# Patient Record
Sex: MALE | Race: WHITE | NOT HISPANIC OR LATINO | Employment: OTHER | URBAN - METROPOLITAN AREA
[De-identification: names, ages, dates, MRNs, and addresses within clinical notes are randomized per-mention and may not be internally consistent; named-entity substitution may affect disease eponyms.]

---

## 2017-08-20 ENCOUNTER — APPOINTMENT (OUTPATIENT)
Dept: RADIOLOGY | Facility: MEDICAL CENTER | Age: 37
End: 2017-08-20
Attending: EMERGENCY MEDICINE

## 2017-08-20 ENCOUNTER — RESOLUTE PROFESSIONAL BILLING HOSPITAL PROF FEE (OUTPATIENT)
Dept: HOSPITALIST | Facility: MEDICAL CENTER | Age: 37
End: 2017-08-20

## 2017-08-20 ENCOUNTER — HOSPITAL ENCOUNTER (OUTPATIENT)
Facility: MEDICAL CENTER | Age: 37
End: 2017-08-21
Attending: EMERGENCY MEDICINE | Admitting: HOSPITALIST
Payer: COMMERCIAL

## 2017-08-20 DIAGNOSIS — R07.9 CHEST PAIN, UNSPECIFIED TYPE: ICD-10-CM

## 2017-08-20 DIAGNOSIS — R00.2 PALPITATIONS: ICD-10-CM

## 2017-08-20 LAB
ALBUMIN SERPL BCP-MCNC: 4.5 G/DL (ref 3.2–4.9)
ALBUMIN/GLOB SERPL: 1.7 G/DL
ALP SERPL-CCNC: 65 U/L (ref 30–99)
ALT SERPL-CCNC: 21 U/L (ref 2–50)
ANION GAP SERPL CALC-SCNC: 8 MMOL/L (ref 0–11.9)
APTT PPP: 29.1 SEC (ref 24.7–36)
AST SERPL-CCNC: 24 U/L (ref 12–45)
BASOPHILS # BLD AUTO: 0.9 % (ref 0–1.8)
BASOPHILS # BLD: 0.05 K/UL (ref 0–0.12)
BILIRUB SERPL-MCNC: 0.4 MG/DL (ref 0.1–1.5)
BUN SERPL-MCNC: 14 MG/DL (ref 8–22)
CALCIUM SERPL-MCNC: 9.9 MG/DL (ref 8.5–10.5)
CHLORIDE SERPL-SCNC: 104 MMOL/L (ref 96–112)
CO2 SERPL-SCNC: 25 MMOL/L (ref 20–33)
CREAT SERPL-MCNC: 0.87 MG/DL (ref 0.5–1.4)
EKG IMPRESSION: NORMAL
EOSINOPHIL # BLD AUTO: 0.35 K/UL (ref 0–0.51)
EOSINOPHIL NFR BLD: 6.4 % (ref 0–6.9)
ERYTHROCYTE [DISTWIDTH] IN BLOOD BY AUTOMATED COUNT: 39.8 FL (ref 35.9–50)
GFR SERPL CREATININE-BSD FRML MDRD: >60 ML/MIN/1.73 M 2
GLOBULIN SER CALC-MCNC: 2.7 G/DL (ref 1.9–3.5)
GLUCOSE SERPL-MCNC: 126 MG/DL (ref 65–99)
HCT VFR BLD AUTO: 46.7 % (ref 42–52)
HGB BLD-MCNC: 16.5 G/DL (ref 14–18)
IMM GRANULOCYTES # BLD AUTO: 0.01 K/UL (ref 0–0.11)
IMM GRANULOCYTES NFR BLD AUTO: 0.2 % (ref 0–0.9)
INR PPP: 1.01 (ref 0.87–1.13)
LIPASE SERPL-CCNC: 31 U/L (ref 11–82)
LYMPHOCYTES # BLD AUTO: 1.92 K/UL (ref 1–4.8)
LYMPHOCYTES NFR BLD: 35 % (ref 22–41)
MCH RBC QN AUTO: 31 PG (ref 27–33)
MCHC RBC AUTO-ENTMCNC: 35.3 G/DL (ref 33.7–35.3)
MCV RBC AUTO: 87.6 FL (ref 81.4–97.8)
MONOCYTES # BLD AUTO: 0.4 K/UL (ref 0–0.85)
MONOCYTES NFR BLD AUTO: 7.3 % (ref 0–13.4)
NEUTROPHILS # BLD AUTO: 2.75 K/UL (ref 1.82–7.42)
NEUTROPHILS NFR BLD: 50.2 % (ref 44–72)
NRBC # BLD AUTO: 0 K/UL
NRBC BLD AUTO-RTO: 0 /100 WBC
PLATELET # BLD AUTO: 272 K/UL (ref 164–446)
PMV BLD AUTO: 9.6 FL (ref 9–12.9)
POTASSIUM SERPL-SCNC: 3.1 MMOL/L (ref 3.6–5.5)
PROT SERPL-MCNC: 7.2 G/DL (ref 6–8.2)
PROTHROMBIN TIME: 13.6 SEC (ref 12–14.6)
RBC # BLD AUTO: 5.33 M/UL (ref 4.7–6.1)
SODIUM SERPL-SCNC: 137 MMOL/L (ref 135–145)
TROPONIN I SERPL-MCNC: <0.01 NG/ML (ref 0–0.04)
WBC # BLD AUTO: 5.5 K/UL (ref 4.8–10.8)

## 2017-08-20 PROCEDURE — 85025 COMPLETE CBC W/AUTO DIFF WBC: CPT

## 2017-08-20 PROCEDURE — 82330 ASSAY OF CALCIUM: CPT

## 2017-08-20 PROCEDURE — 84484 ASSAY OF TROPONIN QUANT: CPT

## 2017-08-20 PROCEDURE — 71010 DX-CHEST-LIMITED (1 VIEW): CPT

## 2017-08-20 PROCEDURE — 85610 PROTHROMBIN TIME: CPT

## 2017-08-20 PROCEDURE — 94760 N-INVAS EAR/PLS OXIMETRY 1: CPT

## 2017-08-20 PROCEDURE — 99285 EMERGENCY DEPT VISIT HI MDM: CPT

## 2017-08-20 PROCEDURE — 83690 ASSAY OF LIPASE: CPT

## 2017-08-20 PROCEDURE — G0378 HOSPITAL OBSERVATION PER HR: HCPCS

## 2017-08-20 PROCEDURE — 36415 COLL VENOUS BLD VENIPUNCTURE: CPT

## 2017-08-20 PROCEDURE — 93005 ELECTROCARDIOGRAM TRACING: CPT

## 2017-08-20 PROCEDURE — 85730 THROMBOPLASTIN TIME PARTIAL: CPT

## 2017-08-20 PROCEDURE — 93005 ELECTROCARDIOGRAM TRACING: CPT | Performed by: EMERGENCY MEDICINE

## 2017-08-20 PROCEDURE — 80053 COMPREHEN METABOLIC PANEL: CPT

## 2017-08-20 RX ORDER — AMOXICILLIN 250 MG
2 CAPSULE ORAL 2 TIMES DAILY
Status: DISCONTINUED | OUTPATIENT
Start: 2017-08-21 | End: 2017-08-21 | Stop reason: HOSPADM

## 2017-08-20 RX ORDER — BISACODYL 10 MG
10 SUPPOSITORY, RECTAL RECTAL
Status: DISCONTINUED | OUTPATIENT
Start: 2017-08-20 | End: 2017-08-21 | Stop reason: HOSPADM

## 2017-08-20 RX ORDER — ALBUTEROL SULFATE 90 UG/1
2 AEROSOL, METERED RESPIRATORY (INHALATION) EVERY 6 HOURS PRN
COMMUNITY
End: 2018-01-17 | Stop reason: SDUPTHER

## 2017-08-20 RX ORDER — POLYETHYLENE GLYCOL 3350 17 G/17G
1 POWDER, FOR SOLUTION ORAL
Status: DISCONTINUED | OUTPATIENT
Start: 2017-08-20 | End: 2017-08-21 | Stop reason: HOSPADM

## 2017-08-20 RX ORDER — FLUTICASONE PROPIONATE 50 MCG
2 SPRAY, SUSPENSION (ML) NASAL
COMMUNITY
End: 2018-01-17

## 2017-08-20 RX ORDER — CETIRIZINE HYDROCHLORIDE 10 MG/1
10 TABLET ORAL DAILY
COMMUNITY

## 2017-08-20 RX ORDER — PSEUDOEPHEDRINE HCL 30 MG
30 TABLET ORAL EVERY 12 HOURS PRN
COMMUNITY
End: 2018-01-17

## 2017-08-20 ASSESSMENT — ENCOUNTER SYMPTOMS
NERVOUS/ANXIOUS: 0
ABDOMINAL PAIN: 0
FEVER: 0
DIARRHEA: 0
SORE THROAT: 0
NAUSEA: 0
VOMITING: 0
PND: 0
CHILLS: 0
PALPITATIONS: 1
COUGH: 0
HEADACHES: 0
DIZZINESS: 1
DIAPHORESIS: 0

## 2017-08-20 ASSESSMENT — COPD QUESTIONNAIRES
HAVE YOU SMOKED AT LEAST 100 CIGARETTES IN YOUR ENTIRE LIFE: NO/DON'T KNOW
DURING THE PAST 4 WEEKS HOW MUCH DID YOU FEEL SHORT OF BREATH: SOME OF THE TIME
COPD SCREENING SCORE: 2
DO YOU EVER COUGH UP ANY MUCUS OR PHLEGM?: YES, A FEW DAYS A WEEK OR MONTH

## 2017-08-20 ASSESSMENT — LIFESTYLE VARIABLES
EVER_SMOKED: NEVER
SUBSTANCE_ABUSE: 0

## 2017-08-20 ASSESSMENT — PAIN SCALES - GENERAL: PAINLEVEL_OUTOF10: 0

## 2017-08-20 NOTE — IP AVS SNAPSHOT
" Home Care Instructions                                                                                                                  Name:Mariano Mata  Medical Record Number:[id number]  CSN: [id number]    YOB: 1980   Age: 36 y.o.  Sex: male  HT:1.727 m (5' 8\") WT: 76.6 kg (168 lb 14 oz)          Admit Date: 8/20/2017     Discharge Date:   Today's Date: 8/21/2017  Attending Doctor:  Raúl Ramirez M.D.                  Allergies:  Review of patient's allergies indicates no active allergies.            Discharge Instructions       Discharge Instructions    Discharged to home by car with relative. Discharged via walking, hospital escort: Refused.  Special equipment needed: Not Applicable    Be sure to schedule a follow-up appointment with your primary care doctor or any specialists as instructed.     Discharge Plan:   Diet Plan: Discussed  Activity Level: Discussed  Confirmed Follow up Appointment: Patient to Call and Schedule Appointment  Confirmed Symptoms Management: Discussed  Medication Reconciliation Updated: Yes  Influenza Vaccine Indication: Patient Refuses    I understand that a diet low in cholesterol, fat, and sodium is recommended for good health. Unless I have been given specific instructions below for another diet, I accept this instruction as my diet prescription.   Other diet: Heart Healthy    Special Instructions: None    · Is patient discharged on Warfarin / Coumadin?   No     · Is patient Post Blood Transfusion?  No    Depression / Suicide Risk    As you are discharged from this [facility] facility, it is important to learn how to keep safe from harming yourself.    Recognize the warning signs:  · Abrupt changes in personality, positive or negative- including increase in energy   · Giving away possessions  · Change in eating patterns- significant weight changes-  positive or negative  · Change in sleeping patterns- unable to sleep or sleeping all the time   · Unwillingness or " inability to communicate  · Depression  · Unusual sadness, discouragement and loneliness  · Talk of wanting to die  · Neglect of personal appearance   · Rebelliousness- reckless behavior  · Withdrawal from people/activities they love  · Confusion- inability to concentrate     If you or a loved one observes any of these behaviors or has concerns about self-harm, here's what you can do:  · Talk about it- your feelings and reasons for harming yourself  · Remove any means that you might use to hurt yourself (examples: pills, rope, extension cords, firearm)  · Get professional help from the community (Mental Health, Substance Abuse, psychological counseling)  · Do not be alone:Call your Safe Contact- someone whom you trust who will be there for you.  · Call your local CRISIS HOTLINE 404-2208 or 836-006-6962  · Call your local Children's Mobile Crisis Response Team [place] (876) 395-6847 or www.First Coverage  · Call the toll free National Suicide Prevention Hotlines   · National Suicide Prevention Lifeline 858-191-KRJG (4681)  · National Hope Line Network 800-SUICIDE (221-0604)    Stress and Stress Management  Stress is a normal reaction to life events. It is what you feel when life demands more than you are used to or more than you can handle. Some stress can be useful. For example, the stress reaction can help you catch the last bus of the day, study for a test, or meet a deadline at work. But stress that occurs too often or for too long can cause problems. It can affect your emotional health and interfere with relationships and normal daily activities. Too much stress can weaken your immune system and increase your risk for physical illness. If you already have a medical problem, stress can make it worse.  CAUSES   All sorts of life events may cause stress. An event that causes stress for one person may not be stressful for another person. Major life events commonly cause stress. These may be positive or  negative. Examples include losing your job, moving into a new home, getting , having a baby, or losing a loved one. Less obvious life events may also cause stress, especially if they occur day after day or in combination. Examples include working long hours, driving in traffic, caring for children, being in debt, or being in a difficult relationship.  SIGNS AND SYMPTOMS  Stress may cause emotional symptoms including, the following:  · Anxiety. This is feeling worried, afraid, on edge, overwhelmed, or out of control.  · Anger. This is feeling irritated or impatient.  · Depression. This is feeling sad, down, helpless, or guilty.  · Difficulty focusing, remembering, or making decisions.  Stress may cause physical symptoms, including the following:   · Aches and pains. These may affect your head, neck, back, stomach, or other areas of your body.  · Tight muscles or clenched jaw.  · Low energy or trouble sleeping.   Stress may cause unhealthy behaviors, including the following:   · Eating to feel better (overeating) or skipping meals.  · Sleeping too little, too much, or both.  · Working too much or putting off tasks (procrastination).  · Smoking, drinking alcohol, or using drugs to feel better.  DIAGNOSIS   Stress is diagnosed through an assessment by your health care provider. Your health care provider will ask questions about your symptoms and any stressful life events. Your health care provider will also ask about your medical history and may order blood tests or other tests. Certain medical conditions and medicine can cause physical symptoms similar to stress.  Mental illness can cause emotional symptoms and unhealthy behaviors similar to stress. Your health care provider may refer you to a mental health professional for further evaluation.   TREATMENT   Stress management is the recommended treatment for stress. The goals of stress management are reducing stressful life events and coping with stress in  "healthy ways.   Techniques for reducing stressful life events include the following:  · Stress identification. Self-monitor for stress and identify what causes stress for you. These skills may help you to avoid some stressful events.  · Time management. Set your priorities, keep a calendar of events, and learn to say \"no.\" These tools can help you avoid making too many commitments.  Techniques for coping with stress include the following:  · Rethinking the problem. Try to think realistically about stressful events rather than ignoring them or overreacting. Try to find the positives in a stressful situation rather than focusing on the negatives.  · Exercise. Physical exercise can release both physical and emotional tension. The key is to find a form of exercise you enjoy and do it regularly.  · Relaxation techniques. These relax the body and mind. Examples include yoga, meditation, van chi, biofeedback, deep breathing, progressive muscle relaxation, listening to music, being out in nature, journaling, and other hobbies. Again, the key is to find one or more that you enjoy and can do regularly.  · Healthy lifestyle. Eat a balanced diet, get plenty of sleep, and do not smoke. Avoid using alcohol or drugs to relax.  · Strong support network. Spend time with family, friends, or other people you enjoy being around. Express your feelings and talk things over with someone you trust.  Counseling or talk therapy with a mental health professional may be helpful if you are having difficulty managing stress on your own. Medicine is typically not recommended for the treatment of stress. Talk to your health care provider if you think you need medicine for symptoms of stress.  HOME CARE INSTRUCTIONS  · Keep all follow-up visits as directed by your health care provider.  · Take all medicines as directed by your health care provider.  SEEK MEDICAL CARE IF:  · Your symptoms get worse or you start having new symptoms.  · You feel " overwhelmed by your problems and can no longer manage them on your own.  SEEK IMMEDIATE MEDICAL CARE IF:  · You feel like hurting yourself or someone else.     This information is not intended to replace advice given to you by your health care provider. Make sure you discuss any questions you have with your health care provider.     Document Released: 06/13/2002 Document Revised: 01/08/2016 Document Reviewed: 08/12/2014  [employer] Patient Education ©2016 [employer]          Your appointments     Aug 23, 2017 11:30 AM   CARE MANAGER TELEPHONE VISIT with CARE MANAGER   [facility]    [address]  [place] [id number] [phone]           ***IMPORTANT**** This is a phone visit only. Do not come into the office. The Care Manager will call you at the scheduled time for Care Manager Telephone Visit.            Aug 24, 2017 10:20 AM   Established Patient with Ellen Kohli M.D.   [facility]    [address]  [place] [id number] [phone]           You will be receiving a confirmation call a few days before your appointment from our automated call confirmation system.            Sep 05, 2017  8:20 AM   Access New Patient with Mariano Velazquez PA-C   [facility])    [address]  [address]  [place] [id number] [phone]           Please bring Photo ID, Insurance Cards, All Medication Bottles and copies of any legal documents (such as Living Will, Power of ) If speaking a language besides English please bring an adult . Please arrive 30 minutes prior for check in and registration. You will be receiving a confirmation call a few days before your appointment from our automated call confirmation system.              Follow-up Information     1. Follow up with Pcp Pt States None In 2 weeks.    Specialty:  Family Medicine    Why:  need to establish with a PCP            Discharge Medication Instructions:    Below are the medications your physician expects you to take upon discharge:    Review all your home medications and newly ordered medications with your doctor and/or pharmacist. Follow medication instructions as directed by your doctor and/or pharmacist.    Please keep your medication list with you and share with your physician.               Medication List      CONTINUE taking these medications        Instructions    Morning Afternoon Evening Bedtime    albuterol 108 (90 Base) MCG/ACT Aers inhalation aerosol        Inhale 2 Puffs by mouth every 6 hours as needed for Shortness of Breath.   Dose:  2 Puff                        cetirizine 10 MG Tabs   Commonly known as:  ZYRTEC        Take 10 mg by mouth every day.   Dose:  10 mg                        fluticasone 50 MCG/ACT nasal spray   Commonly known as:  FLONASE        Spray 2 Sprays in nose 1 time daily as needed.   Dose:  2 Spray                        ONE-A-DAY MENS HEALTH FORMULA PO        Take 1 Tab by mouth every day.   Dose:  1 Tab                        PROBIOTIC & ACIDOPHILUS EX ST PO        Take 1 Tab by mouth every day.   Dose:  1 Tab                        pseudoephedrine 30 MG Tabs   Commonly known as:  SUDAFED        Take 30 mg by mouth every 12 hours as needed for Congestion.   Dose:  30 mg                                Instructions           Diet / Nutrition:    Follow any diet instructions given to you by your doctor or the dietician, including how much salt (sodium) you are allowed each day.    If you are overweight, talk to your doctor about a weight reduction plan.    Activity:    Remain physically active following your doctor's instructions about exercise and activity.    Rest often.     Any time you become even a little tired or short of breath, SIT DOWN and rest.    Worsening Symptoms:    Report any of the following signs and symptoms to the doctor's office immediately:    *Pain of jaw, arm, or  neck  *Chest pain not relieved by medication                               *Dizziness or loss of consciousness  *Difficulty breathing even when at rest   *More tired than usual                                       *Bleeding drainage or swelling of surgical site  *Swelling of feet, ankles, legs or stomach                 *Fever (>100ºF)  *Pink or blood tinged sputum  *Weight gain (3lbs/day or 5lbs /week)           *Shock from internal defibrillator (if applicable)  *Palpitations or irregular heartbeats                *Cool and/or numb extremities    Stroke Awareness    Common Risk Factors for Stroke include:    Age  Atrial Fibrillation  Carotid Artery Stenosis  Diabetes Mellitus  Excessive alcohol consumption  High blood pressure  Overweight   Physical inactivity  Smoking    Warning signs and symptoms of a stroke include:    *Sudden numbness or weakness of the face, arm or leg (especially on one side of the body).  *Sudden confusion, trouble speaking or understanding.  *Sudden trouble seeing in one or both eyes.  *Sudden trouble walking, dizziness, loss of balance or coordination.Sudden severe headache with no known cause.    It is very important to get treatment quickly when a stroke occurs. If you experience any of the above warning signs, call 911 immediately.                   Disclaimer         Quit Smoking / Tobacco Use:    I understand the use of any tobacco products increases my chance of suffering from future heart disease or stroke and could cause other illnesses which may shorten my life. Quitting the use of tobacco products is the single most important thing I can do to improve my health. For further information on smoking / tobacco cessation call a Toll Free Quit Line at 1-842.493.4921 (*National [employer] [place]) or 1-669.922.8954 (American Lung Association) or you can access the web based program at www.lungusa.org.    [place] Tobacco Users Help Line:  (323) 737-2223       Toll Free:  7-642-613-5335  Quit Tobacco Program [facility] (045)384-0491    Crisis Hotline:    National Crisis Hotline:  7-639-CCSQXZG or 1-989.821.1724    [place] Crisis Hotline:    1-951.615.4089 or 497-308-6084    Discharge Survey:   Thank you for choosing [facility]. We hope we did everything we could to make your hospital stay a pleasant one. You may be receiving a phone survey and we would appreciate your time and participation in answering the questions. Your input is very valuable to us in our efforts to improve our service to our patients and their families.        My signature on this form indicates that:    1. I have reviewed and understand the above information.  2. My questions regarding this information have been answered to my satisfaction.  3. I have formulated a plan with my discharge nurse to obtain my prescribed medications for home.                  Disclaimer         __________________________________                     __________       ________                       Patient Signature                                                 Date                    Time

## 2017-08-21 VITALS
OXYGEN SATURATION: 95 % | RESPIRATION RATE: 18 BRPM | DIASTOLIC BLOOD PRESSURE: 62 MMHG | SYSTOLIC BLOOD PRESSURE: 113 MMHG | WEIGHT: 168.87 LBS | HEIGHT: 68 IN | BODY MASS INDEX: 25.59 KG/M2 | TEMPERATURE: 97.4 F | HEART RATE: 73 BPM

## 2017-08-21 PROBLEM — E87.6 HYPOKALEMIA: Status: ACTIVE | Noted: 2017-08-21

## 2017-08-21 LAB
ANION GAP SERPL CALC-SCNC: 5 MMOL/L (ref 0–11.9)
ANION GAP SERPL CALC-SCNC: 8 MMOL/L (ref 0–11.9)
BUN SERPL-MCNC: 10 MG/DL (ref 8–22)
BUN SERPL-MCNC: 12 MG/DL (ref 8–22)
CA-I SERPL-SCNC: 1.2 MMOL/L (ref 1.1–1.3)
CALCIUM SERPL-MCNC: 6.9 MG/DL (ref 8.5–10.5)
CALCIUM SERPL-MCNC: 9.1 MG/DL (ref 8.5–10.5)
CHLORIDE SERPL-SCNC: 108 MMOL/L (ref 96–112)
CHLORIDE SERPL-SCNC: 116 MMOL/L (ref 96–112)
CO2 SERPL-SCNC: 19 MMOL/L (ref 20–33)
CO2 SERPL-SCNC: 22 MMOL/L (ref 20–33)
CREAT SERPL-MCNC: 0.51 MG/DL (ref 0.5–1.4)
CREAT SERPL-MCNC: 0.69 MG/DL (ref 0.5–1.4)
EKG IMPRESSION: NORMAL
FERRITIN SERPL-MCNC: 184.6 NG/ML (ref 22–322)
GFR SERPL CREATININE-BSD FRML MDRD: >60 ML/MIN/1.73 M 2
GFR SERPL CREATININE-BSD FRML MDRD: >60 ML/MIN/1.73 M 2
GLUCOSE SERPL-MCNC: 119 MG/DL (ref 65–99)
GLUCOSE SERPL-MCNC: 69 MG/DL (ref 65–99)
IRON SATN MFR SERPL: 48 % (ref 15–55)
IRON SERPL-MCNC: 133 UG/DL (ref 50–180)
LV EJECT FRACT  99904: 65
LV EJECT FRACT MOD 2C 99903: 67.22
LV EJECT FRACT MOD 4C 99902: 62.06
LV EJECT FRACT MOD BP 99901: 64
MAGNESIUM SERPL-MCNC: 1.5 MG/DL (ref 1.5–2.5)
MAGNESIUM SERPL-MCNC: 3 MG/DL (ref 1.5–2.5)
PHOSPHATE SERPL-MCNC: 3 MG/DL (ref 2.5–4.5)
POTASSIUM SERPL-SCNC: 2.8 MMOL/L (ref 3.6–5.5)
POTASSIUM SERPL-SCNC: 4 MMOL/L (ref 3.6–5.5)
SODIUM SERPL-SCNC: 138 MMOL/L (ref 135–145)
SODIUM SERPL-SCNC: 140 MMOL/L (ref 135–145)
TIBC SERPL-MCNC: 279 UG/DL (ref 250–450)
TSH SERPL DL<=0.005 MIU/L-ACNC: 1.73 UIU/ML (ref 0.3–3.7)
TSH SERPL DL<=0.005 MIU/L-ACNC: 2.43 UIU/ML (ref 0.3–3.7)

## 2017-08-21 PROCEDURE — 82728 ASSAY OF FERRITIN: CPT

## 2017-08-21 PROCEDURE — 83735 ASSAY OF MAGNESIUM: CPT

## 2017-08-21 PROCEDURE — 83540 ASSAY OF IRON: CPT

## 2017-08-21 PROCEDURE — 93010 ELECTROCARDIOGRAM REPORT: CPT | Performed by: INTERNAL MEDICINE

## 2017-08-21 PROCEDURE — 84443 ASSAY THYROID STIM HORMONE: CPT

## 2017-08-21 PROCEDURE — 99236 HOSP IP/OBS SAME DATE HI 85: CPT | Performed by: HOSPITALIST

## 2017-08-21 PROCEDURE — 700111 HCHG RX REV CODE 636 W/ 250 OVERRIDE (IP): Performed by: STUDENT IN AN ORGANIZED HEALTH CARE EDUCATION/TRAINING PROGRAM

## 2017-08-21 PROCEDURE — 84100 ASSAY OF PHOSPHORUS: CPT

## 2017-08-21 PROCEDURE — 700102 HCHG RX REV CODE 250 W/ 637 OVERRIDE(OP): Performed by: STUDENT IN AN ORGANIZED HEALTH CARE EDUCATION/TRAINING PROGRAM

## 2017-08-21 PROCEDURE — 93005 ELECTROCARDIOGRAM TRACING: CPT | Performed by: STUDENT IN AN ORGANIZED HEALTH CARE EDUCATION/TRAINING PROGRAM

## 2017-08-21 PROCEDURE — 93306 TTE W/DOPPLER COMPLETE: CPT

## 2017-08-21 PROCEDURE — G0378 HOSPITAL OBSERVATION PER HR: HCPCS

## 2017-08-21 PROCEDURE — 700105 HCHG RX REV CODE 258: Performed by: STUDENT IN AN ORGANIZED HEALTH CARE EDUCATION/TRAINING PROGRAM

## 2017-08-21 PROCEDURE — A9270 NON-COVERED ITEM OR SERVICE: HCPCS | Performed by: STUDENT IN AN ORGANIZED HEALTH CARE EDUCATION/TRAINING PROGRAM

## 2017-08-21 PROCEDURE — 80048 BASIC METABOLIC PNL TOTAL CA: CPT

## 2017-08-21 PROCEDURE — 96368 THER/DIAG CONCURRENT INF: CPT

## 2017-08-21 PROCEDURE — 96361 HYDRATE IV INFUSION ADD-ON: CPT

## 2017-08-21 PROCEDURE — 96367 TX/PROPH/DG ADDL SEQ IV INF: CPT

## 2017-08-21 PROCEDURE — 96365 THER/PROPH/DIAG IV INF INIT: CPT

## 2017-08-21 PROCEDURE — 83550 IRON BINDING TEST: CPT

## 2017-08-21 PROCEDURE — 93306 TTE W/DOPPLER COMPLETE: CPT | Mod: 26 | Performed by: INTERNAL MEDICINE

## 2017-08-21 PROCEDURE — 96366 THER/PROPH/DIAG IV INF ADDON: CPT

## 2017-08-21 RX ORDER — POTASSIUM CHLORIDE 20 MEQ/1
60 TABLET, EXTENDED RELEASE ORAL ONCE
Status: COMPLETED | OUTPATIENT
Start: 2017-08-21 | End: 2017-08-21

## 2017-08-21 RX ORDER — POTASSIUM CHLORIDE 7.45 MG/ML
10 INJECTION INTRAVENOUS
Status: DISPENSED | OUTPATIENT
Start: 2017-08-21 | End: 2017-08-21

## 2017-08-21 RX ORDER — ASPIRIN 81 MG/1
162 TABLET, CHEWABLE ORAL DAILY
Status: DISCONTINUED | OUTPATIENT
Start: 2017-08-22 | End: 2017-08-21 | Stop reason: HOSPADM

## 2017-08-21 RX ORDER — POTASSIUM CHLORIDE 20 MEQ/1
40 TABLET, EXTENDED RELEASE ORAL DAILY
Status: DISCONTINUED | OUTPATIENT
Start: 2017-08-21 | End: 2017-08-21

## 2017-08-21 RX ORDER — SODIUM CHLORIDE 9 MG/ML
INJECTION, SOLUTION INTRAVENOUS CONTINUOUS
Status: DISCONTINUED | OUTPATIENT
Start: 2017-08-21 | End: 2017-08-21 | Stop reason: HOSPADM

## 2017-08-21 RX ORDER — POTASSIUM CHLORIDE 7.45 MG/ML
10 INJECTION INTRAVENOUS
Status: COMPLETED | OUTPATIENT
Start: 2017-08-21 | End: 2017-08-21

## 2017-08-21 RX ORDER — MAGNESIUM SULFATE HEPTAHYDRATE 40 MG/ML
4 INJECTION, SOLUTION INTRAVENOUS ONCE
Status: COMPLETED | OUTPATIENT
Start: 2017-08-21 | End: 2017-08-21

## 2017-08-21 RX ADMIN — SODIUM CHLORIDE: 9 INJECTION, SOLUTION INTRAVENOUS at 06:39

## 2017-08-21 RX ADMIN — POTASSIUM CHLORIDE 60 MEQ: 1500 TABLET, EXTENDED RELEASE ORAL at 06:37

## 2017-08-21 RX ADMIN — CALCIUM GLUCONATE 1 G: 94 INJECTION, SOLUTION INTRAVENOUS at 04:51

## 2017-08-21 RX ADMIN — POTASSIUM CHLORIDE 10 MEQ: 7.46 INJECTION, SOLUTION INTRAVENOUS at 06:02

## 2017-08-21 RX ADMIN — POTASSIUM CHLORIDE 10 MEQ: 7.46 INJECTION, SOLUTION INTRAVENOUS at 04:56

## 2017-08-21 RX ADMIN — MAGNESIUM SULFATE IN WATER 4 G: 40 INJECTION, SOLUTION INTRAVENOUS at 06:55

## 2017-08-21 ASSESSMENT — ENCOUNTER SYMPTOMS
SHORTNESS OF BREATH: 0
VOMITING: 0
BLURRED VISION: 0
DIARRHEA: 1
HEADACHES: 0
CHILLS: 0
PALPITATIONS: 1
CONSTIPATION: 0
ABDOMINAL PAIN: 0
NAUSEA: 0
FEVER: 0
COUGH: 0
WEIGHT LOSS: 0
HEARTBURN: 0
HEMOPTYSIS: 0
DOUBLE VISION: 0

## 2017-08-21 ASSESSMENT — PATIENT HEALTH QUESTIONNAIRE - PHQ9
SUM OF ALL RESPONSES TO PHQ9 QUESTIONS 1 AND 2: 0
SUM OF ALL RESPONSES TO PHQ QUESTIONS 1-9: 0
1. LITTLE INTEREST OR PLEASURE IN DOING THINGS: NOT AT ALL
2. FEELING DOWN, DEPRESSED, IRRITABLE, OR HOPELESS: NOT AT ALL

## 2017-08-21 NOTE — PROGRESS NOTES
Delores from Lab called with critical result of Calcium-6.9 at 0410. Critical lab result read back to Delores.   Dr. SRINIVAS rosas team notified of critical lab result at 0414.  Critical lab result read back by Dr. SRINIVAS rosas team.

## 2017-08-21 NOTE — ASSESSMENT & PLAN NOTE
36-year-old white male with past medical history of asthma presenting with constant chest heaviness and palpitations since 2 days.  Troponin is negative  normal blood pressure  EKG unremarkable  CBC unremarkable   potassium is 3.1   glucose is 126   Plan:  Aspirin 81 mg 2 tablets to be given tomorrow  Admitting to telemetry for observation.  Trend troponins  Echocardiogram ordered  Lipid panel ordered  TSH ordered  Urine drug screen ordered.

## 2017-08-21 NOTE — PROGRESS NOTES
Pt dc'd home. IV and monitor removed. Pt left unit via walking with wife, Refused hospital escort;. Personal belongings with pt when leaving unit. Pt given discharge instructions prior to leaving unit including when to visit with physician; verbalizes understanding. Copy of discharge instructions with pt and in the chart.

## 2017-08-21 NOTE — DISCHARGE SUMMARY
[facility] Internal Medicine Discharge Summary      Admit Date:  8/20/2017       Discharge Date:   08/21/2017    Service:   [facility] Internal Medicine RedTeam  Attending Physician(s):   MD James       Senior Resident(s):   Thomas  Antonio Resident(s):   Verna      Primary Diagnosis:     Atypical chest pain: Resolved, negative troponin and EKG and echo  Palpitations: Resolved  Hypokalemia, resolved  HypOcalcemia, resolved  Hypomagnesemia, resolved    Secondary Diagnoses:                  Chronic diarrhea  Asthma-stable    Hospital Summary (Brief Narrative):          A 36 years old male with past medical history of asthma was admitted with 2 days history of chest heaviness and palpitations. He also had recent ongoing stress because of the re-location and loss of job and planning for marriage. He denied any intravenous drug abuse. He was heaviness without any nausea vomiting or SOB amnd it was non exertional.  He tried 10 mg of by mouth diazepam, offered by his wife, and that helped a bit with his chest pain. He had family history of sudden cardiac death in his father at age of 55, therefore he was started ER. His EKG and troponin was unremarkable.  He reported having mild asthma using albuterol two to three times a day but was not wheezing at time of symptoms. His exam was unremarkable except for initial tachycardia of 103, consequent heart rate was 60s to 80s, normal blood pressure, saturating around 97 on room air, clear lungs, S1-S2, no S3 or S4. His WBC count was 5.5, hemoglobin 16.5, platelet was 272, potassium 3.1, repeat 2.8, chloride 116, CO2 19, repeat 22, BUN/creatinine are within normal limits, calcium 6.9, repeat 9.1, ionized calcium 1.2, magnesium 1.5, phosphorous 3, troponin <0.01. His EKG did not show any acute ST/T changes and chest x-ray was unremarkable. He was monitored on telemetry and was given aspirin. His pain resolved upon arrival in the ED he did not have any subsequent episode of  palpitation/heaviness abdomen if symptomatic. Echocardiogram was done which did not show any wall motion abnormalities or akinesia and hypokinesia or evidence of hypertrophic obstructive cardiomyopathy. He also endorsed having loose stools daily, one bowel movement which is always loose. He also has hypomagnesemia, hypocalcemia and hypokalemia which was corrected. His iron level was within normal limits. After his normal echocardiogram, and normal telemetry, he was discharged home with recommendation to follow-up and establish with a PCP.     Please establish with a PCP and follow it within one week for follow-up on asthma, electrolyte abnormalities and chronic diarrhea.   He was told that he might benefit from outpatient stress test if he has recurrent symptoms.    Consultants:     None    Procedures:        None    Imaging/ Testing:        CONCLUSIONS  No prior study is available for comparison.   Left ventricular ejection fraction is visually estimated to be 65%.  Normal transthoracic echocardiogram.   Unable to estimate pulmonary artery pressure due to an inadequate   tricuspid regurgitant jet.    CXR:    FINDINGS:  There is no evidence of focal infiltrate or pulmonary edema.  The heart is normal in size.  There is no pleural effusion.  Bony structures and soft tissues are unremarkable.    Discharge Medications:         Medication Reconciliation: @[email or web address]@       Medication List      CONTINUE taking these medications       Instructions    albuterol 108 (90 Base) MCG/ACT Aers inhalation aerosol    Inhale 2 Puffs by mouth every 6 hours as needed for Shortness of Breath.   Dose:  2 Puff       cetirizine 10 MG Tabs   Commonly known as:  ZYRTEC    Take 10 mg by mouth every day.   Dose:  10 mg       fluticasone 50 MCG/ACT nasal spray   Commonly known as:  FLONASE    Spray 2 Sprays in nose 1 time daily as needed.   Dose:  2 Spray       ONE-A-DAY MENS HEALTH FORMULA PO    Take 1 Tab by mouth every day.   Dose:  1  Tab       PROBIOTIC & ACIDOPHILUS EX ST PO    Take 1 Tab by mouth every day.   Dose:  1 Tab       pseudoephedrine 30 MG Tabs   Commonly known as:  SUDAFED    Take 30 mg by mouth every 12 hours as needed for Congestion.   Dose:  30 mg                  Disposition:   Home    Diet:   Healthy    Activity: As tolerated    Instructions:       Please establish with a PCP and follow it within one week for follow-up on asthma, electrolyte abnormalities and chronic diarrhea. He was told that he might benefit from outpatient stress test if he has recurrent symptoms.     The patient was instructed to return to the ER in the event of worsening symptoms. I have counseled the patient on the importance of compliance and the patient has agreed to proceed with all medical recommendations and follow up plan indicated above.   The patient understands that all medications come with benefits and risks. Risks may include permanent injury or death and these risks can be minimized with close reassessment and monitoring.        Primary Care Provider:    nOne  Discharge summary faxed to primary care provider:  @[email or web address]@  Copy of discharge summary given to the patient: @[email or web address]@    Follow up appointment details :          Pending Studies:        none    Time spent on discharge day patient visit, preparing discharge paperwork and arranging for patient follow up.    Summary of follow up issues:     Please establish with a PCP and follow it within one week for follow-up on asthma, electrolyte abnormalities and chronic diarrhea. He was told that he might benefit from outpatient stress test if he has recurrent symptoms.    Discharge Time (Minutes) :    45 minutes      Condition on Discharge    ______________________________________________________________________    Interval history/exam for day of discharge:      GI: Denies nausea or vomiting  Cardiovascular: Denies chest pain or shortness of breath  [facility] clinic: Denies swelling or  pain  Genitourinary: Denies suprapubic pain, tenderness hematuria  Neuro: Denies any numbness or tingling  Lungs: No emoptysis,    Filed Vitals:    08/21/17 0258 08/21/17 0615 08/21/17 0730 08/21/17 1113   BP: 114/62 103/59 113/67 113/62   Pulse: 79 51 64 73   Temp: 36.7 °C (98 °F) 36.9 °C (98.5 °F) 36.1 °C (96.9 °F) 36.3 °C (97.4 °F)   Resp: 20 18 18 18   Height:       Weight:       SpO2: 97% 98% 94% 95%     Weight/BMI: Body mass index is 25.68 kg/(m^2).  Pulse Oximetry: 95 %, O2 (LPM): 0, O2 Delivery: None (Room Air)    GI: NO tenderness, abdomen soft  Cardiovascular: Normal sinus rhythm, no murmur or gallop  Lungs: No crackles or wheezes  Rheumatology: No joint swelling redness  Neuro: No focal deficits  Psychiatric; No suicidal or homicidal ideation,      Most Recent Labs:    Lab Results   Component Value Date/Time    WBC 5.5 08/20/2017 08:00 PM    RBC 5.33 08/20/2017 08:00 PM    HEMOGLOBIN 16.5 08/20/2017 08:00 PM    HEMATOCRIT 46.7 08/20/2017 08:00 PM    MCV 87.6 08/20/2017 08:00 PM    MCH 31.0 08/20/2017 08:00 PM    MCHC 35.3 08/20/2017 08:00 PM    MPV 9.6 08/20/2017 08:00 PM    NEUTROPHILS-POLYS 50.20 08/20/2017 08:00 PM    LYMPHOCYTES 35.00 08/20/2017 08:00 PM    MONOCYTES 7.30 08/20/2017 08:00 PM    EOSINOPHILS 6.40 08/20/2017 08:00 PM    BASOPHILS 0.90 08/20/2017 08:00 PM      Lab Results   Component Value Date/Time    SODIUM 138 08/21/2017 10:13 AM    POTASSIUM 4.0 08/21/2017 10:13 AM    CHLORIDE 108 08/21/2017 10:13 AM    CO2 22 08/21/2017 10:13 AM    GLUCOSE 119* 08/21/2017 10:13 AM    BUN 10 08/21/2017 10:13 AM    CREATININE 0.69 08/21/2017 10:13 AM      Lab Results   Component Value Date/Time    ALT(SGPT) 21 08/20/2017 08:00 PM    AST(SGOT) 24 08/20/2017 08:00 PM    ALKALINE PHOSPHATASE 65 08/20/2017 08:00 PM    TOTAL BILIRUBIN 0.4 08/20/2017 08:00 PM    LIPASE 31 08/20/2017 08:00 PM    ALBUMIN 4.5 08/20/2017 08:00 PM    GLOBULIN 2.7 08/20/2017 08:00 PM    INR 1.01 08/20/2017 08:00 PM     Lab Results    Component Value Date/Time    PT 13.6 08/20/2017 08:00 PM    INR 1.01 08/20/2017 08:00 PM        The patient was seen by me face to face. I personally had a discussion with the patient. The case was discussed with our resident team, I examined the patient and confirmed the essential components of the history, physical examination, diagnosis and treatment plan as needed. I agree with the patient care as documented by the resident and edited as above by me. See resident's note above for complete details of service. The overall treatment regimen will be carried out as described above.     Thank you:  Raúl Ramirez MD, FACP  [facility]  Pager: Use Tiger Text (use resident info under treatment team for day to day floor issues)  Office: [phone] Ext. [phone]  Fax: [phone] (non PHI only)

## 2017-08-21 NOTE — ED NOTES
ASST RN- Pt to room and placed on monitors. PIV established and blood work sent to lab per protocol. Pt denies chest pain, reports palpitations X3 days. Family at bedside, pt AOx4 denies SOB. EKG preformed in triage. Pt resting comfortably on gurney. Respirations are even and unlabored. Bed in lowest position, call light within reach. Pt with no further needs at this time.

## 2017-08-21 NOTE — SENIOR ADMIT NOTE
A 36 years old male with past medical history of Asthma is here today for 2 days history of chest heaviness and palpitations. He reports that his chest heaviness started her on Friday and was continuous until today, however it increases and decreases intermittently over one hour durations the pain is associated with palpitations, occasional dizziness, no nausea or vomiting or diaphoresis. he reports that he has been under a lot of stress recently.moving from [place]. 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 Planning for marriage. He tried 10 mg of by mouth diazepam, offered by his wife, and that helped a bit with his chest pain. He reports having mild asthma using albuterol two to three times a day. He does not report any cough, sputum, or fever. He adds that he used to hike around 10 miles a day without any issues, however he noticed a recent decline in his exercise capacity past month. He denies using recreational drugs. Patient's father had sudden death at age 59, and his heart was found to be enlarged autopsy.     His exam is unremarkable for initial tachycardia of 103, consequent heart rate was 60s to 80s, normal blood pressure, saturating around 97 on room air, clear lungs, S1-S2, has no S3 or S4 his telemetry shows sinus arrhythmia intermittently.    His CBC is unremarkable, his CMP shows a low potassium of 3.1, and a glucose of 126, his troponin is negative    Patient was admitted at the insistence of the ED physician, given the patient's family history.    Chest pain with heart score of 1-2  Admitted to telemetry observation  Urine drug screen  Trend troponins  Echo  Mostly discharged tomorrow if stable overnight  He may need an outpatient stress test & pulmonary function tests with reversibility, +- steroid inhalers    Hypokalemia   Replacing   Continue to monitor

## 2017-08-21 NOTE — NON-PROVIDER
Internal Medicine Medical Student Note    Name Mariano Mata       1980   Age/Sex 36 y.o. male   MRN [id number]   Code Status Full     After 5PM or if no immediate response to page, please call for cross-coverage  Attending/Team: Dr. Ramirez, Red/Yellow See Patient List for primary contact information  Call [phone] to page after hours   1st Call - Day Intern (R1):   Dr. Sellers 2nd Call - Day Sr. Resident (R2/R3):   Dr. Guzman         Reason for interval visit  (Principal Problem)   Chest pain  Interval Problem Daily Status Update  (24 hours)   Mr. Mata is a 37 yo male with a pmh of asthma presenting 16 with 2 days of chest discomfort and palpitations. He discribes the chest pain as heaviness with intermittent palpitations. The events were not brought about by physical activity. His father passed away at 55 yo from an MI. 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 He has diarrhea that has been pretty constant for the past 6 months.     Today, the chest discomfort and palpitations are no longer present, and he feels like he is back to his baseline. We discussed with him that he can be discharged once we get the results from the echocardiogram from this morning. His potassium level and calcium level dropped overnight, but are now WNL.     Review of Systems   Constitutional: Negative for fever, chills and weight loss.   HENT: Negative for hearing loss.    Eyes: Negative for blurred vision and double vision.   Respiratory: Negative for cough, hemoptysis and shortness of breath.    Cardiovascular: Positive for chest pain and palpitations. Negative for leg swelling.   Gastrointestinal: Positive for diarrhea. Negative for heartburn, nausea, vomiting, abdominal pain and constipation.   Genitourinary: Negative for dysuria and urgency.   Neurological: Negative for headaches.       Physical Exam       Filed  Vitals:    08/20/17 2354 08/21/17 0258 08/21/17 0615 08/21/17 0730   BP:  114/62 103/59 113/67   Pulse: 72 79 51 64   Temp:  36.7 °C (98 °F) 36.9 °C (98.5 °F) 36.1 °C (96.9 °F)   Resp: 22 20 18 18   Height:       Weight:       SpO2: 97% 97% 98% 94%     Body mass index is 25.68 kg/(m^2). Weight: 76.6 kg (168 lb 14 oz)  Oxygen Therapy:  Pulse Oximetry: 94 %, O2 (LPM): 0, O2 Delivery: None (Room Air)    Physical Exam   Constitutional: He is oriented to person, place, and time and well-developed, well-nourished, and in no distress.   HENT:   Head: Normocephalic and atraumatic.   Eyes: Conjunctivae and EOM are normal. Pupils are equal, round, and reactive to light.   Cardiovascular: Normal rate and regular rhythm.    Pulmonary/Chest: Effort normal and breath sounds normal. No respiratory distress.   Abdominal: Soft. Bowel sounds are normal. He exhibits no distension. There is no tenderness.   Neurological: He is alert and oriented to person, place, and time. No cranial nerve deficit.   Skin: Skin is warm and dry.     Lab Results   Component Value Date/Time    SODIUM 138 08/21/2017 10:13 AM    POTASSIUM 4.0 08/21/2017 10:13 AM    CHLORIDE 108 08/21/2017 10:13 AM    CO2 22 08/21/2017 10:13 AM    GLUCOSE 119* 08/21/2017 10:13 AM    BUN 10 08/21/2017 10:13 AM    CREATININE 0.69 08/21/2017 10:13 AM      Lab Results   Component Value Date/Time    WBC 5.5 08/20/2017 08:00 PM    RBC 5.33 08/20/2017 08:00 PM    HEMOGLOBIN 16.5 08/20/2017 08:00 PM    HEMATOCRIT 46.7 08/20/2017 08:00 PM    MCV 87.6 08/20/2017 08:00 PM    MCH 31.0 08/20/2017 08:00 PM    MCHC 35.3 08/20/2017 08:00 PM    MPV 9.6 08/20/2017 08:00 PM    NEUTROPHILS-POLYS 50.20 08/20/2017 08:00 PM    LYMPHOCYTES 35.00 08/20/2017 08:00 PM    MONOCYTES 7.30 08/20/2017 08:00 PM    EOSINOPHILS 6.40 08/20/2017 08:00 PM    BASOPHILS 0.90 08/20/2017 08:00 PM      TSH: 1.73  Troponin: <0.01  Magnesium: 1.5  Phosphorus:  3.0    CXR: There is no evidence of focal infiltrate or  pulmonary edema. The heart is normal in size. There is no pleural effusion. Bony structures and soft tissues are unremarkable    EKG: Interpretive Statements   SINUS RHYTHM   ATRIAL PREMATURE COMPLEX   ST ELEV, PROBABLE NORMAL EARLY REPOL PATTERN   BASELINE WANDER IN LEAD(S) V2,V3   Compared to ECG 08/20/2017 19:38:59   Atrial premature complex(es) now present   ST (T wave) deviation now present     Assessment/Plan   Mr. Mata is a 37 yo male with a family history of MI presenting with chest discomfort and palpitations.     Chest pain:  Mr. Mata reports 3 days of chest discomfort and palpitations that is not triggered by physical activity. EKG was not concerning and troponin level is <0.01. An echocardiogram has been done. BP and HR are normal. TSH 1.73.   Chest pain mostly likely caused by anxiety. Troponin level and EKG done to rule out cardiac causes. Awaiting the results of the echocardiogram to further rule out cardiac causes.   Plan:  -wait for echocardiogram interpretation  -continue aspirin 81 mg  -discharge today    Hypokalemia and hypocalcemia  Mr. Mata complains of diarrhea for the past 6 months that is worse when he drinks coffee.  Potassium: 4.0  Calcium: 9.1  Plan:  -electrolytes have normalized  -follow up with PCP about cause of chronic diarrhea

## 2017-08-21 NOTE — DISCHARGE INSTRUCTIONS
Discharge Instructions    Discharged to home by car with relative. Discharged via walking, hospital escort: Refused.  Special equipment needed: Not Applicable    Be sure to schedule a follow-up appointment with your primary care doctor or any specialists as instructed.     Discharge Plan:   Diet Plan: Discussed  Activity Level: Discussed  Confirmed Follow up Appointment: Patient to Call and Schedule Appointment  Confirmed Symptoms Management: Discussed  Medication Reconciliation Updated: Yes  Influenza Vaccine Indication: Patient Refuses    I understand that a diet low in cholesterol, fat, and sodium is recommended for good health. Unless I have been given specific instructions below for another diet, I accept this instruction as my diet prescription.   Other diet: Heart Healthy    Special Instructions: None    · Is patient discharged on Warfarin / Coumadin?   No     · Is patient Post Blood Transfusion?  No    Depression / Suicide Risk    As you are discharged from this [facility] facility, it is important to learn how to keep safe from harming yourself.    Recognize the warning signs:  · Abrupt changes in personality, positive or negative- including increase in energy   · Giving away possessions  · Change in eating patterns- significant weight changes-  positive or negative  · Change in sleeping patterns- unable to sleep or sleeping all the time   · Unwillingness or inability to communicate  · Depression  · Unusual sadness, discouragement and loneliness  · Talk of wanting to die  · Neglect of personal appearance   · Rebelliousness- reckless behavior  · Withdrawal from people/activities they love  · Confusion- inability to concentrate     If you or a loved one observes any of these behaviors or has concerns about self-harm, here's what you can do:  · Talk about it- your feelings and reasons for harming yourself  · Remove any means that you might use to hurt yourself (examples: pills, rope, extension cords,  firearm)  · Get professional help from the community (Mental Health, Substance Abuse, psychological counseling)  · Do not be alone:Call your Safe Contact- someone whom you trust who will be there for you.  · Call your local CRISIS HOTLINE 711-0532 or 135-199-7306  · Call your local Children's Mobile Crisis Response Team [place] (823) 917-7080 or www.Finovera  · Call the toll free National Suicide Prevention Hotlines   · National Suicide Prevention Lifeline 642-870-CUOB (5705)  · [employer] Hope Line Network 800-SUICIDE (381-8499)    Stress and Stress Management  Stress is a normal reaction to life events. It is what you feel when life demands more than you are used to or more than you can handle. Some stress can be useful. For example, the stress reaction can help you catch the last bus of the day, study for a test, or meet a deadline at work. But stress that occurs too often or for too long can cause problems. It can affect your emotional health and interfere with relationships and normal daily activities. Too much stress can weaken your immune system and increase your risk for physical illness. If you already have a medical problem, stress can make it worse.  CAUSES   All sorts of life events may cause stress. An event that causes stress for one person may not be stressful for another person. Major life events commonly cause stress. These may be positive or negative. Examples include losing your job, moving into a new home, getting , having a baby, or losing a loved one. Less obvious life events may also cause stress, especially if they occur day after day or in combination. Examples include working long hours, driving in traffic, caring for children, being in debt, or being in a difficult relationship.  SIGNS AND SYMPTOMS  Stress may cause emotional symptoms including, the following:  · Anxiety. This is feeling worried, afraid, on edge, overwhelmed, or out of control.  · Anger. This is feeling  "irritated or impatient.  · Depression. This is feeling sad, down, helpless, or guilty.  · Difficulty focusing, remembering, or making decisions.  Stress may cause physical symptoms, including the following:   · Aches and pains. These may affect your head, neck, back, stomach, or other areas of your body.  · Tight muscles or clenched jaw.  · Low energy or trouble sleeping.   Stress may cause unhealthy behaviors, including the following:   · Eating to feel better (overeating) or skipping meals.  · Sleeping too little, too much, or both.  · Working too much or putting off tasks (procrastination).  · Smoking, drinking alcohol, or using drugs to feel better.  DIAGNOSIS   Stress is diagnosed through an assessment by your health care provider. Your health care provider will ask questions about your symptoms and any stressful life events. Your health care provider will also ask about your medical history and may order blood tests or other tests. Certain medical conditions and medicine can cause physical symptoms similar to stress.  Mental illness can cause emotional symptoms and unhealthy behaviors similar to stress. Your health care provider may refer you to a mental health professional for further evaluation.   TREATMENT   Stress management is the recommended treatment for stress. The goals of stress management are reducing stressful life events and coping with stress in healthy ways.   Techniques for reducing stressful life events include the following:  · Stress identification. Self-monitor for stress and identify what causes stress for you. These skills may help you to avoid some stressful events.  · Time management. Set your priorities, keep a calendar of events, and learn to say \"no.\" These tools can help you avoid making too many commitments.  Techniques for coping with stress include the following:  · Rethinking the problem. Try to think realistically about stressful events rather than ignoring them or " overreacting. Try to find the positives in a stressful situation rather than focusing on the negatives.  · Exercise. Physical exercise can release both physical and emotional tension. The key is to find a form of exercise you enjoy and do it regularly.  · Relaxation techniques. These relax the body and mind. Examples include yoga, meditation, van chi, biofeedback, deep breathing, progressive muscle relaxation, listening to music, being out in nature, journaling, and other hobbies. Again, the key is to find one or more that you enjoy and can do regularly.  · Healthy lifestyle. Eat a balanced diet, get plenty of sleep, and do not smoke. Avoid using alcohol or drugs to relax.  · Strong support network. Spend time with family, friends, or other people you enjoy being around. Express your feelings and talk things over with someone you trust.  Counseling or talk therapy with a mental health professional may be helpful if you are having difficulty managing stress on your own. Medicine is typically not recommended for the treatment of stress. Talk to your health care provider if you think you need medicine for symptoms of stress.  HOME CARE INSTRUCTIONS  · Keep all follow-up visits as directed by your health care provider.  · Take all medicines as directed by your health care provider.  SEEK MEDICAL CARE IF:  · Your symptoms get worse or you start having new symptoms.  · You feel overwhelmed by your problems and can no longer manage them on your own.  SEEK IMMEDIATE MEDICAL CARE IF:  · You feel like hurting yourself or someone else.     This information is not intended to replace advice given to you by your health care provider. Make sure you discuss any questions you have with your health care provider.     Document Released: 06/13/2002 Document Revised: 01/08/2016 Document Reviewed: 08/12/2014  [employer] Patient Education ©2016 [employer]

## 2017-08-21 NOTE — ED NOTES
"Mariano Mata  36 y.o.  Chief Complaint   Patient presents with   • Palpitations     starting friday afternoon, consistent and not worsening       EKG completed prior to triage. Patient ambulatory to ED with above complaint. Denies cardiac history. + hx. Asthma. States that he moved here 2 weeks ago - took his rescue inhaler a couple of times but says \"this feels different than my heart racing from that.\" No SOB/dyspnea noted. Complains of slight lightheadedness. Denies dizziness/nausea.      Triage process explained to patient, apologized for wait time, and returned to lobby.  "

## 2017-08-21 NOTE — PROGRESS NOTES
"Bedside report received 0710. POC discussed with pt; Pt denies palpitations; States,\" I feel fine now, feeling like I did before this all started to happen.\" No overnight events;  all questions answered at this time.   "

## 2017-08-21 NOTE — ED PROVIDER NOTES
"ED Provider Note    CHIEF COMPLAINT  Chief Complaint   Patient presents with   • Palpitations     starting friday afternoon, consistent and not worsening       HPI  Mariano Mata is a 36 y.o. male here for evaluation of chest discomfort, and palpitations. The pt states that he has been having these issues over the last few days, but with worsening symptoms over the last day.  He has no fever, no vomiting.  He states he will go to sleep, wake up, and feel a lot of pressure/palpitations.  He has no history of the same.  He states that his dad  of an MI when he himself was in his 50's.  He denies any current sob, nausea, or vomiting.  No diaphoresis.     PAST MEDICAL HISTORY   has a past medical history of Asthma and Seasonal allergies.    SOCIAL HISTORY  Social History     Social History Main Topics   • Smoking status: Never Smoker    • Smokeless tobacco: Never Used   • Alcohol Use: Yes      Comment: 1-2 beers daily   • Drug Use: No   • Sexual Activity: Not on file       SURGICAL HISTORY  patient denies any surgical history    CURRENT MEDICATIONS  Home Medications     Reviewed by Mya Youssef, Pharmacy Int (Pharmacy Intern) on 17 at 2048  Med List Status: Complete    Medication Last Dose Status    albuterol 108 (90 Base) MCG/ACT Aero Soln inhalation aerosol 2017 Active    cetirizine (ZYRTEC) 10 MG Tab prn Active    fluticasone (FLONASE) 50 MCG/ACT nasal spray prn Active    Multiple Vitamins-Minerals (ONE-A-DAY MENS HEALTH FORMULA PO) 2017 Active    Probiotic Product (PROBIOTIC & ACIDOPHILUS EX ST PO) 2017 Active    pseudoephedrine (SUDAFED) 30 MG Tab prn Active                ALLERGIES  No Active Allergies    REVIEW OF SYSTEMS  See HPI for further details. Review of systems as above, otherwise all other systems are negative.     PHYSICAL EXAM  VITAL SIGNS: Pulse 86  Temp(Src) 36.7 °C (98 °F)  Resp 22  Ht 1.727 m (5' 8\")  Wt 72.576 kg (160 lb)  BMI 24.33 kg/m2  SpO2 99%  "   Constitutional: Well appearing patient.  Not toxic, nor ill in appearance.  HEENT: NC/AT.  Extra Ocular Muscles Intact. Posterior pharynx clear, and without exudate. No uvula edema.  Neck: Full range of motion; non tender. no meningismus.  Cardiovascular: Regular heart rate and rhythm.  No murmurs, rubs, nor gallop appreciated.   Back:  Non tender midline.  No obvious step off or deformity.  Thorax & Lungs: Lungs are clear to auscultation with good air movement bilaterally.  No wheeze, rhonchi, nor rales.   Abdomen: Soft, with no tenderness, rebound nor guarding.  No mass, pulsatile mass, nor hepatosplenomegaly appreciated.  Skin: No purpura nor petechia noted.  No rash.  Extremities/Musculoskeletal: No sign of trauma.    Musculoskeletal: Range of motion is intact in all major joints.  Neurologic: Alert & oriented x 3.  CN II-XII grossly intact.   Strength and sensation is intact. Clear speech, appropriate, cooperative.  Psychiatric: Normal affect appropriate for the clinical situation.    Results for orders placed or performed during the hospital encounter of 08/20/17   Troponin   Result Value Ref Range    Troponin I <0.01 0.00 - 0.04 ng/mL   CBC with Differential   Result Value Ref Range    WBC 5.5 4.8 - 10.8 K/uL    RBC 5.33 4.70 - 6.10 M/uL    Hemoglobin 16.5 14.0 - 18.0 g/dL    Hematocrit 46.7 42.0 - 52.0 %    MCV 87.6 81.4 - 97.8 fL    MCH 31.0 27.0 - 33.0 pg    MCHC 35.3 33.7 - 35.3 g/dL    RDW 39.8 35.9 - 50.0 fL    Platelet Count 272 164 - 446 K/uL    MPV 9.6 9.0 - 12.9 fL    Neutrophils-Polys 50.20 44.00 - 72.00 %    Lymphocytes 35.00 22.00 - 41.00 %    Monocytes 7.30 0.00 - 13.40 %    Eosinophils 6.40 0.00 - 6.90 %    Basophils 0.90 0.00 - 1.80 %    Immature Granulocytes 0.20 0.00 - 0.90 %    Nucleated RBC 0.00 /100 WBC    Neutrophils (Absolute) 2.75 1.82 - 7.42 K/uL    Lymphs (Absolute) 1.92 1.00 - 4.80 K/uL    Monos (Absolute) 0.40 0.00 - 0.85 K/uL    Eos (Absolute) 0.35 0.00 - 0.51 K/uL    Baso  (Absolute) 0.05 0.00 - 0.12 K/uL    Immature Granulocytes (abs) 0.01 0.00 - 0.11 K/uL    NRBC (Absolute) 0.00 K/uL   Complete Metabolic Panel (CMP)   Result Value Ref Range    Sodium 137 135 - 145 mmol/L    Potassium 3.1 (L) 3.6 - 5.5 mmol/L    Chloride 104 96 - 112 mmol/L    Co2 25 20 - 33 mmol/L    Anion Gap 8.0 0.0 - 11.9    Glucose 126 (H) 65 - 99 mg/dL    Bun 14 8 - 22 mg/dL    Creatinine 0.87 0.50 - 1.40 mg/dL    Calcium 9.9 8.5 - 10.5 mg/dL    AST(SGOT) 24 12 - 45 U/L    ALT(SGPT) 21 2 - 50 U/L    Alkaline Phosphatase 65 30 - 99 U/L    Total Bilirubin 0.4 0.1 - 1.5 mg/dL    Albumin 4.5 3.2 - 4.9 g/dL    Total Protein 7.2 6.0 - 8.2 g/dL    Globulin 2.7 1.9 - 3.5 g/dL    A-G Ratio 1.7 g/dL   Prothrombin Time   Result Value Ref Range    PT 13.6 12.0 - 14.6 sec    INR 1.01 0.87 - 1.13   APTT   Result Value Ref Range    APTT 29.1 24.7 - 36.0 sec   Lipase   Result Value Ref Range    Lipase 31 11 - 82 U/L   ESTIMATED GFR   Result Value Ref Range    GFR If African American >60 >60 mL/min/1.73 m 2    GFR If Non African American >60 >60 mL/min/1.73 m 2   EKG (ER)   Result Value Ref Range    Report       [facility] Emergency Dept.    Test Date:  2017  Pt Name:    JEANETTE GERARD                Department: ER  MRN:        [id number]                      Room:  Gender:     M                            Technician: [id number]  :        1980                   Requested By:ER TRIAGE PROTOCOL  Order #:    [id number]                    Reading MD:    Measurements  Intervals                                Axis  Rate:       83                           P:          47  NJ:         152                          QRS:        -4  QRSD:       92                           T:          48  QT:         364  QTc:        428    Interpretive Statements  SINUS RHYTHM  No previous ECG available for comparison       DX-CHEST-LIMITED (1 VIEW)   Final Result      No evidence of acute cardiopulmonary process.             PROCEDURES     MEDICAL RECORD  I have reviewed patient's medical record and pertinent results are listed above.    COURSE & MEDICAL DECISION MAKING  I have reviewed any medical record information, laboratory studies and radiographic results as noted above.    9:08 PM  At this time, [facility] will admit the pt for a cardiac rule out.  This because of his fathers early death.      FINAL IMPRESSION  1. Chest pain, unspecified type    2. Palpitations      Electronically signed by: Anthony Lobato, 8/20/2017 9:04 PM

## 2017-08-21 NOTE — H&P
Internal Medicine Admitting History and Physical    Name Mariano Mata 1980   Age/Sex 36 y.o. male   MRN [id number]   Code Status Full Code      After 5PM or if no immediate response to page, please call for cross-coverage  Attending/Team:   James JOE MD See Patient List for primary contact information  Call [phone] to page    1st Call - Day Intern (R1):   Verna Valladares MD 2nd Call - Day Sr. Resident (R2/R3):   Zac Hernandez MD       Chief Complaint:  Chest pain x  3  days  Palpitations ×3 days    HPI:  The patient is a 36-year-old white male with a past medical history of asthma, seasonal allergies presents to the emergency department with recent onset of chest pain and palpitations.  He has been in good health most of his life.He has been having heaviness in his chest and palpitations which has been constantly present since Friday afternoon. He noticed heaviness of his chest , heart pounding which has been constant. He describes the episodes as feeling of fullness in his chest and a racing heart. The events does not seem to be triggered by stress or physical exertion and have a court at all times during the day and night. The palpitations are constant with a peak for about 10 minutes and is associated with shortness of breath and chest discomfort. He drinks about 1-4 cups of coffee a day and has been under a lot of stress over the past 2 weeks. He denies similar episodes in the past, nausea, vomiting, fever, cough, edema, weight loss. His father  of sudden cardiac arrest in the age of 56 and was found to have an enlarged heart on autopsy.  He also denies any history of depression or anxiety disorders and reported that he does not smoke, take any nonprescription drugs.  He has a past medical history of asthma, has to use albuterol inhaler 1 puff every other day. He can hike up to 10 miles without getting any chest pain, palpitations or any acute symptoms. He had played tennis during  Friday afternoon for about 30 minutes without any symptoms. He tries to exercise every day for about an hour.    Review of Systems   Constitutional: Negative for fever, chills, malaise/fatigue and diaphoresis.   HENT: Negative for congestion and sore throat.    Respiratory: Negative for cough.    Cardiovascular: Positive for chest pain and palpitations. Negative for leg swelling and PND.   Gastrointestinal: Negative for nausea, vomiting, abdominal pain and diarrhea.   Genitourinary: Negative for dysuria.   Musculoskeletal: Negative for joint pain.   Skin: Negative for rash.   Neurological: Positive for dizziness. Negative for headaches.   Psychiatric/Behavioral: Negative for substance abuse. The patient is not nervous/anxious.              Past Medical History:   Past Medical History   Diagnosis Date   • Asthma    • Seasonal allergies        Past Surgical History:  History reviewed. No pertinent past surgical history.    Current Outpatient Medications:  Home Medications     Reviewed by Mya Youssef, Pharmacy Int (Pharmacy Intern) on 17 at 2048  Med List Status: Complete    Medication Last Dose Status    albuterol 108 (90 Base) MCG/ACT Aero Soln inhalation aerosol 2017 Active    cetirizine (ZYRTEC) 10 MG Tab prn Active    fluticasone (FLONASE) 50 MCG/ACT nasal spray prn Active    Multiple Vitamins-Minerals (ONE-A-DAY MENS HEALTH FORMULA PO) 2017 Active    Probiotic Product (PROBIOTIC & ACIDOPHILUS EX ST PO) 2017 Active    pseudoephedrine (SUDAFED) 30 MG Tab prn Active                Medication Allergy/Sensitivities:  No Active Allergies      Family History:  Family History   Problem Relation Age of Onset   • Heart Disease Father      Pt father  of MI in 50s       Social History:  Social History     Social History   • Marital Status:      Spouse Name: N/A   • Number of Children: N/A   • Years of Education: N/A     Occupational History   • Not on file.     Social History Main Topics  "  • Smoking status: Never Smoker    • Smokeless tobacco: Never Used   • Alcohol Use: Yes      Comment: 1-2 beers daily   • Drug Use: No   • Sexual Activity: Not on file     Other Topics Concern   • Not on file     Social History Narrative   • No narrative on file     Living situation: Lives with his fiance  PCP : None      Physical Exam     Filed Vitals:    08/20/17 2200 08/20/17 2227 08/20/17 2239 08/20/17 2354   BP:  122/63     Pulse: 66 67  72   Temp:  36.4 °C (97.5 °F)     Resp: 19 16  22   Height:       Weight:   76.6 kg (168 lb 14 oz)    SpO2: 97% 97%  97%     Body mass index is 25.68 kg/(m^2).  /63 mmHg  Pulse 72  Temp(Src) 36.4 °C (97.5 °F)  Resp 22  Ht 1.727 m (5' 8\")  Wt 76.6 kg (168 lb 14 oz)  BMI 25.68 kg/m2  SpO2 97%  O2 therapy: Pulse Oximetry: 97 %, O2 (LPM): 0, O2 Delivery: None (Room Air)    Physical Exam   Constitutional: He is oriented to person, place, and time and well-developed, well-nourished, and in no distress.   HENT:   Head: Normocephalic and atraumatic.   Eyes: Conjunctivae and EOM are normal. Pupils are equal, round, and reactive to light.   Neck: Normal range of motion. Neck supple. No JVD present.   Cardiovascular: Normal rate, regular rhythm and normal heart sounds.    Pulmonary/Chest: Effort normal and breath sounds normal.   Abdominal: Soft. Bowel sounds are normal.   Musculoskeletal: Normal range of motion.   Lymphadenopathy:     He has no cervical adenopathy.   Neurological: He is alert and oriented to person, place, and time.   Psychiatric: Affect normal.             Data Review       Old Records Request:   Completed  Current Records review and summary: Completed    Lab Data Review:  Recent Results (from the past 24 hour(s))   EKG (ER)    Collection Time: 08/20/17  7:38 PM   Result Value Ref Range    Report       [facility] Emergency Dept.    Test Date:  2017-08-20  Pt Name:    JEANETTE GERARD                Department: ER  MRN:        [id number]       "                Room:  Gender:     M                            Technician: [id number]  :        1980                   Requested By:ER TRIAGE PROTOCOL  Order #:    [id number]                    Reading MD:    Measurements  Intervals                                Axis  Rate:       83                           P:          47  UT:         152                          QRS:        -4  QRSD:       92                           T:          48  QT:         364  QTc:        428    Interpretive Statements  SINUS RHYTHM  No previous ECG available for comparison     Troponin    Collection Time: 17  8:00 PM   Result Value Ref Range    Troponin I <0.01 0.00 - 0.04 ng/mL   CBC with Differential    Collection Time: 17  8:00 PM   Result Value Ref Range    WBC 5.5 4.8 - 10.8 K/uL    RBC 5.33 4.70 - 6.10 M/uL    Hemoglobin 16.5 14.0 - 18.0 g/dL    Hematocrit 46.7 42.0 - 52.0 %    MCV 87.6 81.4 - 97.8 fL    MCH 31.0 27.0 - 33.0 pg    MCHC 35.3 33.7 - 35.3 g/dL    RDW 39.8 35.9 - 50.0 fL    Platelet Count 272 164 - 446 K/uL    MPV 9.6 9.0 - 12.9 fL    Neutrophils-Polys 50.20 44.00 - 72.00 %    Lymphocytes 35.00 22.00 - 41.00 %    Monocytes 7.30 0.00 - 13.40 %    Eosinophils 6.40 0.00 - 6.90 %    Basophils 0.90 0.00 - 1.80 %    Immature Granulocytes 0.20 0.00 - 0.90 %    Nucleated RBC 0.00 /100 WBC    Neutrophils (Absolute) 2.75 1.82 - 7.42 K/uL    Lymphs (Absolute) 1.92 1.00 - 4.80 K/uL    Monos (Absolute) 0.40 0.00 - 0.85 K/uL    Eos (Absolute) 0.35 0.00 - 0.51 K/uL    Baso (Absolute) 0.05 0.00 - 0.12 K/uL    Immature Granulocytes (abs) 0.01 0.00 - 0.11 K/uL    NRBC (Absolute) 0.00 K/uL   Complete Metabolic Panel (CMP)    Collection Time: 17  8:00 PM   Result Value Ref Range    Sodium 137 135 - 145 mmol/L    Potassium 3.1 (L) 3.6 - 5.5 mmol/L    Chloride 104 96 - 112 mmol/L    Co2 25 20 - 33 mmol/L    Anion Gap 8.0 0.0 - 11.9    Glucose 126 (H) 65 - 99 mg/dL    Bun 14 8 - 22 mg/dL    Creatinine 0.87 0.50 - 1.40  mg/dL    Calcium 9.9 8.5 - 10.5 mg/dL    AST(SGOT) 24 12 - 45 U/L    ALT(SGPT) 21 2 - 50 U/L    Alkaline Phosphatase 65 30 - 99 U/L    Total Bilirubin 0.4 0.1 - 1.5 mg/dL    Albumin 4.5 3.2 - 4.9 g/dL    Total Protein 7.2 6.0 - 8.2 g/dL    Globulin 2.7 1.9 - 3.5 g/dL    A-G Ratio 1.7 g/dL   Prothrombin Time    Collection Time: 08/20/17  8:00 PM   Result Value Ref Range    PT 13.6 12.0 - 14.6 sec    INR 1.01 0.87 - 1.13   APTT    Collection Time: 08/20/17  8:00 PM   Result Value Ref Range    APTT 29.1 24.7 - 36.0 sec   Lipase    Collection Time: 08/20/17  8:00 PM   Result Value Ref Range    Lipase 31 11 - 82 U/L   ESTIMATED GFR    Collection Time: 08/20/17  8:00 PM   Result Value Ref Range    GFR If African American >60 >60 mL/min/1.73 m 2    GFR If Non African American >60 >60 mL/min/1.73 m 2       Imaging/Procedures Review:    ndependant Imaging Review: Completed  DX-CHEST-LIMITED (1 VIEW)   Final Result      No evidence of acute cardiopulmonary process.      ECHOCARDIOGRAM COMP W/O CONT    (Results Pending)            EKG:   EKG Independant Review: Completed  QTc:428, HR: 83, Normal Sinus Rhythm, no ST/T changes     Records reviewed and summarized in current documentation             Assessment/Plan   1) Chest pain:  36-year-old white male with past medical history of asthma presenting with constant chest heaviness and palpitations since 2 days.  Troponin is negative  normal blood pressure  EKG unremarkable  CBC unremarkable   potassium is 3.1   glucose is 126   Plan:  Aspirin 81 mg 2 tablets to be given tomorrow  Admitting to telemetry for observation.  Trend troponins  Echocardiogram ordered  Lipid panel ordered  TSH ordered  Urine drug screen ordered.    2) Hypokalemia:  Potassium is 3.1  KCl 40 mEq Daily.      Anticipated Hospital stay: Observation admit        Quality Measures  EKG reviewed, Labs reviewed, Medications reviewed and Radiology images reviewed  Leonardo catheter: No Leonardo      DVT Prophylaxis:  Enoxaparin (Lovenox)    Ulcer prophylaxis: Not indicated          The patient was seen by me face to face. I personally had a discussion with the patient. The case was discussed with our resident team, I examined the patient and confirmed the essential components of the history, physical examination, diagnosis and treatment plan as needed. I agree with the patient care as documented by the resident and edited as above by me. See resident's note above for complete details of service. The overall treatment regimen will be carried out as described above.     Thank you:  Raúl Ramirez MD, FACP  [facility]  Pager: Use Tiger Text (use resident info under treatment team for day to day floor issues)  Office: [phone] Ext. [phone]  Fax: [phone] (non PHI only)

## 2017-08-23 ENCOUNTER — PATIENT OUTREACH (OUTPATIENT)
Dept: HEALTH INFORMATION MANAGEMENT | Facility: OTHER | Age: 37
End: 2017-08-23

## 2017-08-24 ENCOUNTER — OFFICE VISIT (OUTPATIENT)
Dept: MEDICAL GROUP | Facility: CLINIC | Age: 37
End: 2017-08-24

## 2017-08-24 VITALS
SYSTOLIC BLOOD PRESSURE: 100 MMHG | OXYGEN SATURATION: 99 % | HEART RATE: 84 BPM | TEMPERATURE: 98.6 F | WEIGHT: 160 LBS | HEIGHT: 68 IN | DIASTOLIC BLOOD PRESSURE: 68 MMHG | RESPIRATION RATE: 14 BRPM | BODY MASS INDEX: 24.25 KG/M2

## 2017-08-24 DIAGNOSIS — Z09 HOSPITAL DISCHARGE FOLLOW-UP: ICD-10-CM

## 2017-08-24 DIAGNOSIS — R00.2 PALPITATIONS: ICD-10-CM

## 2017-08-24 DIAGNOSIS — E87.6 HYPOKALEMIA: ICD-10-CM

## 2017-08-24 DIAGNOSIS — J45.20 MILD INTERMITTENT ASTHMA WITHOUT COMPLICATION: ICD-10-CM

## 2017-08-24 DIAGNOSIS — R19.5 LOOSE STOOLS: ICD-10-CM

## 2017-08-24 PROCEDURE — 99496 TRANSJ CARE MGMT HIGH F2F 7D: CPT | Performed by: FAMILY MEDICINE

## 2017-08-24 ASSESSMENT — PATIENT HEALTH QUESTIONNAIRE - PHQ9: CLINICAL INTERPRETATION OF PHQ2 SCORE: 0

## 2017-08-24 NOTE — PATIENT INSTRUCTIONS
If you need further assistance, or have any questions; concerns or lingering symptoms before seeing your Primary Care Provider or specialist.     Do not hesitate to contact us.     Please contact us at the Post-Hospital Follow Up Program at [phone].   Our offices hours are Monday-Friday 8 am-5 pm.

## 2017-08-24 NOTE — MR AVS SNAPSHOT
"        Mariano Mata   2017 10:20 AM   Office Visit   MRN: [id number]    Department:  [facility]   Dept Phone:  [phone]    Description:  Male : 1980   Provider:  Ellen Kohli M.D.           Reason for Visit     Hospital Follow-up Chest pain;      Allergies as of 2017     No Known Allergies      You were diagnosed with     Hospital discharge follow-up   [539780]       Palpitations   [785.1.ICD-9-CM]       Hypokalemia   [934566]       Mild intermittent asthma without complication   [528499]       Loose stools   [018095]         Vital Signs     Blood Pressure Pulse Temperature Respirations Height Weight    100/68 mmHg 84 37 °C (98.6 °F) 14 1.727 m (5' 7.99\") 72.576 kg (160 lb)    Body Mass Index Oxygen Saturation Smoking Status             24.33 kg/m2 99% Never Smoker          Basic Information     Date Of Birth Sex Race Ethnicity Preferred Language    1980 Male White Non- English      Your appointments     Sep 05, 2017  8:20 AM   Access New Patient with Mariano YADIRA Velazquez PA-C   [facility])    [address]  [address]  [place] [address] [phone]           Please bring Photo ID, Insurance Cards, All Medication Bottles and copies of any legal documents (such as Living Will, Power of ) If speaking a language besides English please bring an adult . Please arrive 30 minutes prior for check in and registration. You will be receiving a confirmation call a few days before your appointment from our automated call confirmation system.              Problem List              ICD-10-CM Priority Class Noted - Resolved    Palpitations R00.2   2017 - Present    Chest pain R07.9   2017 - Present    Hypokalemia E87.6   2017 - Present    Mild intermittent asthma without complication J45.20   2017 - Present      Health Maintenance     Patient has no pending health maintenance at this time      "   Current Immunizations     No immunizations on file.      Below and/or attached are the medications your provider expects you to take. Review all of your home medications and newly ordered medications with your provider and/or pharmacist. Follow medication instructions as directed by your provider and/or pharmacist. Please keep your medication list with you and share with your provider. Update the information when medications are discontinued, doses are changed, or new medications (including over-the-counter products) are added; and carry medication information at all times in the event of emergency situations     Allergies:  No Known Allergies          Medications  Valid as of: August 24, 2017 - 12:47 PM    Generic Name Brand Name Tablet Size Instructions for use    Albuterol Sulfate (Aero Soln) albuterol 108 (90 Base) MCG/ACT Inhale 2 Puffs by mouth every 6 hours as needed for Shortness of Breath.        Cetirizine HCl (Tab) ZYRTEC 10 MG Take 10 mg by mouth every day.        Fluticasone Propionate (Suspension) FLONASE 50 MCG/ACT Spray 2 Sprays in nose 1 time daily as needed.        Multiple Vitamins-Minerals   Take 1 Tab by mouth every day.        Probiotic Product   Take 1 Tab by mouth every day.        Pseudoephedrine HCl (Tab) SUDAFED 30 MG Take 30 mg by mouth every 12 hours as needed for Congestion.        .                 Medicines prescribed today were sent to:     None      Medication refill instructions:       If your prescription bottle indicates you have medication refills left, it is not necessary to call your provider’s office. Please contact your pharmacy and they will refill your medication.    If your prescription bottle indicates you do not have any refills left, you may request refills at any time through one of the following ways: The online [employer] system (except Urgent Care), by calling your provider’s office, or by asking your pharmacy to contact your provider’s office with a refill request.  Medication refills are processed only during regular business hours and may not be available until the next business day. Your provider may request additional information or to have a follow-up visit with you prior to refilling your medication.   *Please Note: Medication refills are assigned a new Rx number when refilled electronically. Your pharmacy may indicate that no refills were authorized even though a new prescription for the same medication is available at the pharmacy. Please request the medicine by name with the pharmacy before contacting your provider for a refill.        Instructions    If you need further assistance, or have any questions; concerns or lingering symptoms before seeing your Primary Care Provider or specialist.     Do not hesitate to contact us.     Please contact us at the Post-Hospital Follow Up Program at [phone].   Our offices hours are Monday-Friday 8 am-5 pm.            [employer] Access Code: Activation code not generated  Current [employer] Status: Active

## 2017-08-24 NOTE — PROGRESS NOTES
Subjective:     Mariano Mata is a 36 y.o. male who presents for Hospital Follow-up.  Chart and discharge summary reviewed.   Date of discharge 8/21/17.  48- hour post discharge RN call completed on 8/23/17 and documented in the medical record by Eri No RN:  POST DISCHARGE CALL:  Discharge Date:8/21/2017   Date of Outreach Call: 8/23/2017 11:28 AM  Now that you're home, how are you doing? Very Good  Comment:Great. No further chest pain or SOB.   Do you have questions about your medications? No    Did you fill your medications? No  Comment:No new Rx.     Do you have a follow-up appointment scheduled?Yes  Comment:Dc clinic on 8/24. New PCP on 9/5    Discharging Department: Clinical Decision Unit    Number of Attempts: 1  Current or previous attempts completed within two business days of discharge? Yes  Provided education regarding treatment plan, medication, self-management, ADLs? Yes  Has patient completed Advance Directive? If yes, advise them to bring to appointment. No  Comment:Encouraged to complete one.   Care Manager phone number provided? *  Is there anything else I can help you with? No  Comment:States he knows he will be private pay -  insurance kicks in on 9/1         HPI: Recently hospitalized for Palpitations for 2 days along with some chest discomfort. Family history is significant for his father dying of sudden cardiac death at age 55. He was found to have a low potassium and calcium level as well as mildly decreased magnesium level. Electrolytes were replenished. He was monitored on telemetry without significant findings and echocardiogram also showed no significant findings.    Since returning home, patient reports feeling good.   His only chronic medical problem is mild asthma which he occasionally uses albuterol inhaler.      Allergies:   Review of patient's allergies indicates no known allergies.    Social History:  Social History   Substance Use Topics   • Smoking status: Never Smoker   "  • Smokeless tobacco: Never Used   • Alcohol Use: 4.2 oz/week     7 Cans of beer per week      Comment: 1-2 beers daily        ROS:    Constitutional:  Denies fever, chills, night sweats, fatigue or malaise  HENT: Denies head congestion, ear pain or drainage, decreased hearing, sore throat  Eyes: Denies visual changes, eye drainage or redness, eye pain  Neck: Denies pain, swollen glands, decreased range of motion  Lungs: Denies shortness of breath, wheezing, cough  Cardiovascular: Denies chest pain, orthopnea, lower extremity edema, palpitations  Abdominal: Denies abdominal pain, change in bladder habits, nausea or vomiting.He notes that recently he has had some loose stools but not frequent.  Musculoskeletal: Denies back pain, joint pains, decreased range of motion  Endocrine: Denies unexplained weight changes, heat or cold intolerance, hair loss, polyuria or polydipsia  Neurological: Denies dizziness, headache, confusion, focal weakness or numbness, memory loss  Psychiatric: Denies depression, anxiety, insomnia       Objective:     Blood pressure 100/68, pulse 84, temperature 37 °C (98.6 °F), resp. rate 14, height 1.727 m (5' 7.99\"), weight 72.576 kg (160 lb), SpO2 99 %.     Physical Exam:    GEN:  Alert, oriented, in no distress  HEENT:  PERRLA, EOMI  NECK;  Supple without adenopathy or thyromegaly.   LUNGS:  Clear to auscultation without rales, rhonci, or wheezes.  CV:  Heart RRR without murmurs or S3 or S4; peripheral pulses intact.  ABD:  Soft, nontender, nondistended  EXT:  Without cyanosis, clubbing, or edema.  NEURO:  Cranial nerves II through XII intact.  Motor function and sensation intact.  SKIN: No rashes or suspicious lesions.  PSYCH:  Behavior is appropriate.      Assessment and Plan:     1. Hospital follow-up:   Hospitalization and results reviewed with patient. High risk conditions requiring teaching or care coordination were identified and addressed.The patient demonstrate understanding of " admission and underlying conditions. The patient understands discharge instructions and when to seek medical attention. Medications reviewed including instructions regarding high risk medications, dosing and side effects.    2. Palpitations  -Palpitations, possibly secondary to hypokalemia    3. Hypokalemia  -Resolved but unclear etiology. Consider GI consult to evaluate for malabsorption    4. Mild intermittent asthma without complication  -Is continuing albuterol inhaler as needed    5. Loose stools  -He will establish care with a new PCP who can discuss referral for GI consult.        Medication Reconciliation  Medication list at end of encounter:   Current Outpatient Prescriptions   Medication Sig Dispense Refill   • albuterol 108 (90 Base) MCG/ACT Aero Soln inhalation aerosol Inhale 2 Puffs by mouth every 6 hours as needed for Shortness of Breath.     • Probiotic Product (PROBIOTIC & ACIDOPHILUS EX ST PO) Take 1 Tab by mouth every day.     • Multiple Vitamins-Minerals (ONE-A-DAY MENS HEALTH FORMULA PO) Take 1 Tab by mouth every day.     • pseudoephedrine (SUDAFED) 30 MG Tab Take 30 mg by mouth every 12 hours as needed for Congestion.     • cetirizine (ZYRTEC) 10 MG Tab Take 10 mg by mouth every day.     • fluticasone (FLONASE) 50 MCG/ACT nasal spray Spray 2 Sprays in nose 1 time daily as needed.       No current facility-administered medications for this visit.       Recommended followup:  with new PCP  Future Appointments       Provider Department Center    9/5/2017 8:20 AM Mariano Velazquez PA-C [facility]          Patient Instruction  Patient provided with educational material on discharge diagnosis and management of symptoms/red flags. Patient instructed to keep follow-up appointments and to bring written questions and and actual medications to each office visit. Patient instructed to call PCP/specialist with any problems/questions/concerns. Patient verbalizes  understanding and has no further questions at this time.    Total of 45 minutes face-to-face time was spent with patient, with greater than 50% of the time spent in counseling and coordination of care.

## 2018-01-17 ENCOUNTER — OFFICE VISIT (OUTPATIENT)
Dept: MEDICAL GROUP | Facility: MEDICAL CENTER | Age: 38
End: 2018-01-17
Payer: COMMERCIAL

## 2018-01-17 VITALS
HEART RATE: 75 BPM | OXYGEN SATURATION: 99 % | SYSTOLIC BLOOD PRESSURE: 118 MMHG | BODY MASS INDEX: 26.07 KG/M2 | RESPIRATION RATE: 16 BRPM | HEIGHT: 68 IN | WEIGHT: 172 LBS | DIASTOLIC BLOOD PRESSURE: 72 MMHG | TEMPERATURE: 97.2 F

## 2018-01-17 DIAGNOSIS — Z00.00 ROUTINE GENERAL MEDICAL EXAMINATION AT A HEALTH CARE FACILITY: ICD-10-CM

## 2018-01-17 DIAGNOSIS — J45.909 MODERATE ASTHMA WITHOUT COMPLICATION, UNSPECIFIED WHETHER PERSISTENT: ICD-10-CM

## 2018-01-17 PROBLEM — R00.2 PALPITATIONS: Status: RESOLVED | Noted: 2017-08-20 | Resolved: 2018-01-17

## 2018-01-17 PROBLEM — R07.9 CHEST PAIN: Status: RESOLVED | Noted: 2017-08-20 | Resolved: 2018-01-17

## 2018-01-17 PROBLEM — J45.20 MILD INTERMITTENT ASTHMA WITHOUT COMPLICATION: Status: RESOLVED | Noted: 2017-08-24 | Resolved: 2018-01-17

## 2018-01-17 PROBLEM — E87.6 HYPOKALEMIA: Status: RESOLVED | Noted: 2017-08-21 | Resolved: 2018-01-17

## 2018-01-17 PROCEDURE — 99204 OFFICE O/P NEW MOD 45 MIN: CPT | Performed by: NURSE PRACTITIONER

## 2018-01-17 RX ORDER — ALBUTEROL SULFATE 90 UG/1
2 AEROSOL, METERED RESPIRATORY (INHALATION) EVERY 6 HOURS PRN
Qty: 8.5 G | Refills: 11 | Status: SHIPPED | OUTPATIENT
Start: 2018-01-17 | End: 2022-03-28 | Stop reason: SDUPTHER

## 2018-01-17 ASSESSMENT — ENCOUNTER SYMPTOMS
COUGH: 1
WHEEZING: 1

## 2018-01-17 NOTE — PROGRESS NOTES
Subjective:      Mariano Mata is a 37 y.o. male who presents with Establish Care        CC:  Patient is here today to establish care as well as requesting lab work and medication for asthma.    HPI Mariano Mata was initially seen in this system at the emergency room in August 2017 for chest pain which was found to be noncardiac related. He did have a follow-up at the posthospitalization clinic. His echocardiogram was normal and no troponin elevation. Potassium levels have been low in the hospital as well.    He reports his only chronic problem is asthma and normally he would use his albuterol infrequently but over the past few months he is noticing that he is using it more often. He often will use it twice a day and it typically is after working out. He does not remember ever having a pulmonary function test. He admits there might be a psychological component to his feeling of need of albuterol. He reports he feels somewhat wheezy and the albuterol makes him breathe easier with exercise. He does not smoke cigarettes.      Social History   Substance Use Topics   • Smoking status: Never Smoker   • Smokeless tobacco: Never Used   • Alcohol use 4.2 oz/week     7 Cans of beer per week      Comment: 1-2 beers daily     Current Outpatient Prescriptions   Medication Sig Dispense Refill   • fluticasone-salmeterol (ADVAIR) 100-50 MCG/DOSE AEROSOL POWDER, BREATH ACTIVATED Inhale 1 Puff by mouth every 12 hours. 1 Inhaler 11   • albuterol 108 (90 Base) MCG/ACT Aero Soln inhalation aerosol Inhale 2 Puffs by mouth every 6 hours as needed for Shortness of Breath. 8.5 g 11   • Probiotic Product (PROBIOTIC & ACIDOPHILUS EX ST PO) Take 1 Tab by mouth every day.     • Multiple Vitamins-Minerals (ONE-A-DAY MENS HEALTH FORMULA PO) Take 1 Tab by mouth every day.     • cetirizine (ZYRTEC) 10 MG Tab Take 10 mg by mouth every day.       No current facility-administered medications for this visit.      Past Medical History:   Diagnosis Date  "  • Asthma    • Seasonal allergies      Family History   Problem Relation Age of Onset   • Heart Disease Father      Pt father  of MI in 50s   • No Known Problems Mother        Review of Systems   Respiratory: Positive for cough and wheezing.    All other systems reviewed and are negative.         Objective:     /72   Pulse 75   Temp 36.2 °C (97.2 °F)   Resp 16   Ht 1.727 m (5' 8\")   Wt 78 kg (172 lb)   SpO2 99%   BMI 26.15 kg/m²      Physical Exam   Constitutional: He is oriented to person, place, and time. He appears well-developed and well-nourished. No distress.   HENT:   Head: Normocephalic and atraumatic.   Right Ear: External ear normal.   Left Ear: External ear normal.   Nose: Nose normal.   Mouth/Throat: Oropharynx is clear and moist.   Eyes: Conjunctivae are normal. Right eye exhibits no discharge. Left eye exhibits no discharge.   Neck: Normal range of motion. Neck supple. No tracheal deviation present. No thyromegaly present.   Cardiovascular: Normal rate, regular rhythm and normal heart sounds.    No murmur heard.  Pulmonary/Chest: Effort normal and breath sounds normal. No respiratory distress. He has no wheezes. He has no rales.   Lymphadenopathy:     He has no cervical adenopathy.   Neurological: He is alert and oriented to person, place, and time. Coordination normal.   Skin: Skin is warm and dry. No rash noted. He is not diaphoretic. No erythema.   Psychiatric: He has a normal mood and affect. His behavior is normal. Judgment and thought content normal.   Nursing note and vitals reviewed.              Assessment/Plan:     1. Moderate asthma without complication, unspecified whether persistent  Patient is using his albuterol twice a day and it may be more psychological than actual physical need so I advised him to go for a pulmonary function test. In the meantime I will start him on Advair Diskus to see if this helps with his breathing and lessens his need for albuterol. He was " instructed to rinse his mouth out afterwards.  - fluticasone-salmeterol (ADVAIR) 100-50 MCG/DOSE AEROSOL POWDER, BREATH ACTIVATED; Inhale 1 Puff by mouth every 12 hours.  Dispense: 1 Inhaler; Refill: 11  - PULMONARY FUNCTION TESTS Test requested: Spirometry with-out & with Bronchodilator  - albuterol 108 (90 Base) MCG/ACT Aero Soln inhalation aerosol; Inhale 2 Puffs by mouth every 6 hours as needed for Shortness of Breath.  Dispense: 8.5 g; Refill: 11    2. Routine general medical examination at a health care facility  With patient's family history, I would like to be sure he does not have dyslipidemia and we need to recheck potassium levels. Apparently his potassium levels were slightly low in the hospital although he also was dehydrated.  - COMP METABOLIC PANEL; Future  - LIPID PROFILE; Future

## 2018-01-22 ENCOUNTER — HOSPITAL ENCOUNTER (OUTPATIENT)
Dept: LAB | Facility: MEDICAL CENTER | Age: 38
End: 2018-01-22
Attending: NURSE PRACTITIONER
Payer: COMMERCIAL

## 2018-01-22 DIAGNOSIS — Z00.00 ROUTINE GENERAL MEDICAL EXAMINATION AT A HEALTH CARE FACILITY: ICD-10-CM

## 2018-01-22 LAB
ALBUMIN SERPL BCP-MCNC: 4.9 G/DL (ref 3.2–4.9)
ALBUMIN/GLOB SERPL: 2.2 G/DL
ALP SERPL-CCNC: 44 U/L (ref 30–99)
ALT SERPL-CCNC: 35 U/L (ref 2–50)
ANION GAP SERPL CALC-SCNC: 6 MMOL/L (ref 0–11.9)
AST SERPL-CCNC: 26 U/L (ref 12–45)
BILIRUB SERPL-MCNC: 0.7 MG/DL (ref 0.1–1.5)
BUN SERPL-MCNC: 12 MG/DL (ref 8–22)
CALCIUM SERPL-MCNC: 9.4 MG/DL (ref 8.5–10.5)
CHLORIDE SERPL-SCNC: 105 MMOL/L (ref 96–112)
CHOLEST SERPL-MCNC: 225 MG/DL (ref 100–199)
CO2 SERPL-SCNC: 28 MMOL/L (ref 20–33)
CREAT SERPL-MCNC: 0.79 MG/DL (ref 0.5–1.4)
GLOBULIN SER CALC-MCNC: 2.2 G/DL (ref 1.9–3.5)
GLUCOSE SERPL-MCNC: 85 MG/DL (ref 65–99)
HDLC SERPL-MCNC: 60 MG/DL
LDLC SERPL CALC-MCNC: 146 MG/DL
POTASSIUM SERPL-SCNC: 4.1 MMOL/L (ref 3.6–5.5)
PROT SERPL-MCNC: 7.1 G/DL (ref 6–8.2)
SODIUM SERPL-SCNC: 139 MMOL/L (ref 135–145)
TRIGL SERPL-MCNC: 95 MG/DL (ref 0–149)

## 2018-01-22 PROCEDURE — 80053 COMPREHEN METABOLIC PANEL: CPT

## 2018-01-22 PROCEDURE — 80061 LIPID PANEL: CPT

## 2018-01-22 PROCEDURE — 36415 COLL VENOUS BLD VENIPUNCTURE: CPT

## 2018-01-30 ENCOUNTER — OFFICE VISIT (OUTPATIENT)
Dept: URGENT CARE | Facility: CLINIC | Age: 38
End: 2018-01-30
Payer: COMMERCIAL

## 2018-01-30 VITALS
SYSTOLIC BLOOD PRESSURE: 110 MMHG | DIASTOLIC BLOOD PRESSURE: 70 MMHG | HEIGHT: 68 IN | WEIGHT: 171.1 LBS | RESPIRATION RATE: 16 BRPM | TEMPERATURE: 98.2 F | OXYGEN SATURATION: 97 % | BODY MASS INDEX: 25.93 KG/M2 | HEART RATE: 86 BPM

## 2018-01-30 DIAGNOSIS — J11.1 INFLUENZA-LIKE ILLNESS: ICD-10-CM

## 2018-01-30 DIAGNOSIS — J02.9 SORE THROAT: ICD-10-CM

## 2018-01-30 PROCEDURE — 99214 OFFICE O/P EST MOD 30 MIN: CPT | Performed by: PHYSICIAN ASSISTANT

## 2018-01-30 ASSESSMENT — ENCOUNTER SYMPTOMS
MYALGIAS: 1
DIARRHEA: 0
SORE THROAT: 1
COUGH: 1
EYE REDNESS: 0
ABDOMINAL PAIN: 0
WHEEZING: 0
VOMITING: 0
SHORTNESS OF BREATH: 0
NECK PAIN: 0
TINGLING: 0
SPUTUM PRODUCTION: 0
FEVER: 1
DIZZINESS: 0
CHILLS: 1
HEADACHES: 0
EYE DISCHARGE: 0

## 2018-01-31 NOTE — PROGRESS NOTES
"Subjective:      Mariano Mata is a 37 y.o. male who presents with Cough (x4days, cough, fever, sore throat, body aches)            Patient is 37-year-old male who presents with cough, fevers, sore throat, and body aches for the last 4-5 days. Patient also reports history of asthma however he has not had to increase his use of his rescue inhaler at this time. Of note patient did not receive his flu immunization this year.      Cough   This is a new problem. Episode onset: 4-5 days ago. The problem has been waxing and waning. The problem occurs every few minutes. The cough is non-productive. Associated symptoms include chills, a fever, myalgias and a sore throat. Pertinent negatives include no chest pain, ear pain, eye redness, headaches, rash, shortness of breath or wheezing. Nothing aggravates the symptoms. He has tried OTC cough suppressant (Tylenol) for the symptoms. His past medical history is significant for asthma.       Review of Systems   Constitutional: Positive for chills, fever and malaise/fatigue.   HENT: Positive for sore throat. Negative for congestion, ear discharge and ear pain.    Eyes: Negative for discharge and redness.   Respiratory: Positive for cough. Negative for sputum production, shortness of breath and wheezing.    Cardiovascular: Negative for chest pain and leg swelling.   Gastrointestinal: Negative for abdominal pain, diarrhea and vomiting.   Genitourinary: Negative for dysuria and urgency.   Musculoskeletal: Positive for myalgias. Negative for neck pain.   Skin: Negative for itching and rash.   Neurological: Negative for dizziness, tingling and headaches.          Objective:     /70   Pulse 86   Temp 36.8 °C (98.2 °F)   Resp 16   Ht 1.727 m (5' 8\")   Wt 77.6 kg (171 lb 1.6 oz)   SpO2 97%   BMI 26.02 kg/m²    PMH:  has a past medical history of Asthma and Seasonal allergies.  MEDS:   Current Outpatient Prescriptions:   •  fluticasone-salmeterol (ADVAIR) 100-50 MCG/DOSE " AEROSOL POWDER, BREATH ACTIVATED, Inhale 1 Puff by mouth every 12 hours., Disp: 1 Inhaler, Rfl: 11  •  albuterol 108 (90 Base) MCG/ACT Aero Soln inhalation aerosol, Inhale 2 Puffs by mouth every 6 hours as needed for Shortness of Breath., Disp: 8.5 g, Rfl: 11  •  Pseudoephedrine-APAP-DM (DAYQUIL PO), Take  by mouth., Disp: , Rfl:   •  Probiotic Product (PROBIOTIC & ACIDOPHILUS EX ST PO), Take 1 Tab by mouth every day., Disp: , Rfl:   •  Multiple Vitamins-Minerals (ONE-A-DAY MENS HEALTH FORMULA PO), Take 1 Tab by mouth every day., Disp: , Rfl:   •  cetirizine (ZYRTEC) 10 MG Tab, Take 10 mg by mouth every day., Disp: , Rfl:   ALLERGIES: No Known Allergies  SURGHX: No past surgical history on file.  SOCHX:  reports that he has never smoked. He has never used smokeless tobacco. He reports that he drinks about 4.2 oz of alcohol per week . He reports that he does not use drugs.  FH: Family history was reviewed, no pertinent findings to report    Physical Exam   Constitutional: He is oriented to person, place, and time. He appears well-developed and well-nourished.   HENT:   Head: Normocephalic and atraumatic.   Mouth/Throat: No oropharyngeal exudate.   Ears- Canals clear- TM- with clear fluid effusions bilaterally.   Pos. PND, with slight erythema- without tonsillar edema or exudate.   Mild discharge noted bilaterally- to nares.      Eyes: EOM are normal. Pupils are equal, round, and reactive to light.   Neck: Normal range of motion. Neck supple.   Cardiovascular: Normal rate and regular rhythm.    No murmur heard.  Pulmonary/Chest: Effort normal and breath sounds normal. No respiratory distress. He has no wheezes.   Musculoskeletal: Normal range of motion. He exhibits no tenderness.   Lymphadenopathy:     He has no cervical adenopathy.   Neurological: He is alert and oriented to person, place, and time.   Skin: Skin is warm. No rash noted.   Psychiatric: He has a normal mood and affect. His behavior is normal.   Vitals  reviewed.            POC strep- negative.     Assessment/Plan:     1. Influenza-like illness  2. Sore throat    Patient was not tested for influenza that he is outside the window for Tamiflu to be efficacious. This most likely is influenza as patient strep was negative today-I do not see any indication for antibiotics at this time encouraged supportive therapies and utilization of patient rescue inhaler if needed.It was explained to the pt. Today that due to the viral nature of the pt's illness, we will treat symptomatically today. Encouraged OTC supportive meds PRN. Humidification, increase fluids, avoid night time dairy.   Patient given precautionary s/sx that mandate immediate follow up and evaluation in the ED. Advised of risks of not doing so.    DDX, Supportive care, and indications for immediate follow-up discussed with patient.    Instructed to return to clinic or nearest emergency department if we are not available for any change in condition, further concerns, or worsening of symptoms.    The patient demonstrated a good understanding and agreed with the treatment plan.  .

## 2018-02-07 ENCOUNTER — TELEMEDICINE2 (OUTPATIENT)
Dept: URGENT CARE | Facility: PHYSICIAN GROUP | Age: 38
End: 2018-02-07
Payer: COMMERCIAL

## 2018-02-07 DIAGNOSIS — J01.00 ACUTE NON-RECURRENT MAXILLARY SINUSITIS: ICD-10-CM

## 2018-02-07 PROCEDURE — 99213 OFFICE O/P EST LOW 20 MIN: CPT | Performed by: PHYSICIAN ASSISTANT

## 2018-02-07 RX ORDER — DOXYCYCLINE HYCLATE 100 MG
100 TABLET ORAL 2 TIMES DAILY
Qty: 20 TAB | Refills: 0 | Status: SHIPPED | OUTPATIENT
Start: 2018-02-07 | End: 2021-11-27

## 2018-02-08 NOTE — PROGRESS NOTES
Patient presents for a virtual visit.  Identification was verified.  She was informed that encounter would be contucted using secure, encrypted videoconferencing equipment and consent was obtained.       CC:  Sinus congestion/pressure - worsening.  8-9 days.     HPI:  Patient presents today for about 8-9 days of worsening sinus pressure and congestion.  Patient was seen about 7 days ago for influenza like illness and treated in clinic.  He had negative test however was rx Tamiflu.  He felt he got slightly better but then feels his sinus symptoms returned more intensely. He feels aching above his teeth on the left but has congestion throughout.  Discolored mucus.  OTC not helping with sinus congestion.     Review of Systems   Constitutional: Positive possible fevers/ malaise/fatigue. Negative for chills and diaphoresis.   HENT: SEE HPI  Respiratory: Mild coughing, no sputum production. Negative for hemoptysis, shortness of breath and wheezing.    Cardiovascular: Negative for chest pain, palpitations, orthopnea and leg swelling.   Musculoskeletal: Negative for myalgias.   Skin: Negative for rash.   Neurological: Negative for weakness and headaches.      Medications, Allergies, and current problem list reviewed today in Epic      Objective:      There were no vitals taken for this visit.      Physical Exam   Constitutional: She is oriented to person, place, and time. She appears well-developed and well-nourished. No distress.   HENT:   Head: Normocephalic.   Nose: Nose normal.   Mouth/Throat: Oropharynx is clear and moist. No oropharyngeal exudate.   Bilateral sinuses tender to patient palpation, LEFT>RIGHT, patient with mild notable left maxillary sinus swelling.   Eyes: Conjunctivae are normal. Right eye exhibits no discharge. Left eye exhibits no discharge.   Neck: Neck supple.   Pulmonary/Chest: Effort normal.   Musculoskeletal: She exhibits no edema.   Neurological: She is alert and oriented to person, place, and  time.   Skin: She is not diaphoretic.          1. Acute non-recurrent maxillary sinusitis  doxycycline (VIBRAMYCIN) 100 MG Tab       -consistent with developing unilateral bacterial sinusitis.   Sinus care at home discussed  -fluids, rest emphasized.  Add back Flonase  -humidifier/steam inhalations to soothe lungs.   -rx as above.  -patient to be seen again in clinic if treatment failure.  Patient understands the limitations of virtual visit and means of diagnosing acute illness.   He will follow up if worsening at anytime or failure to improve in 3-4 days.       Supportive care, differential diagnoses, and indications for immediate follow-up discussed with patient.   Pathogenesis of diagnosis discussed including typical length and natural progression.   Instructed to return to clinic or nearest emergency department for any change in condition, further concerns, or worsening of symptoms.  Patient states understanding of the plan of care and discharge instructions        Rukhsana Dsouza P.A.-C.

## 2018-02-15 ENCOUNTER — HOSPITAL ENCOUNTER (OUTPATIENT)
Dept: LAB | Facility: MEDICAL CENTER | Age: 38
End: 2018-02-15
Attending: OBSTETRICS & GYNECOLOGY
Payer: COMMERCIAL

## 2018-02-15 LAB
HBV SURFACE AB SERPL IA-ACNC: >1000 MIU/ML (ref 0–10)
HBV SURFACE AG SER QL: NEGATIVE
HCV AB SER QL: NEGATIVE
HIV 1+2 AB+HIV1 P24 AG SERPL QL IA: NON REACTIVE
TREPONEMA PALLIDUM IGG+IGM AB [PRESENCE] IN SERUM OR PLASMA BY IMMUNOASSAY: NON REACTIVE

## 2018-02-15 PROCEDURE — 87491 CHLMYD TRACH DNA AMP PROBE: CPT

## 2018-02-15 PROCEDURE — 87591 N.GONORRHOEAE DNA AMP PROB: CPT

## 2018-02-15 PROCEDURE — 87340 HEPATITIS B SURFACE AG IA: CPT

## 2018-02-15 PROCEDURE — 86706 HEP B SURFACE ANTIBODY: CPT

## 2018-02-15 PROCEDURE — 86803 HEPATITIS C AB TEST: CPT

## 2018-02-15 PROCEDURE — 36415 COLL VENOUS BLD VENIPUNCTURE: CPT

## 2018-02-15 PROCEDURE — 87389 HIV-1 AG W/HIV-1&-2 AB AG IA: CPT

## 2018-02-15 PROCEDURE — 86780 TREPONEMA PALLIDUM: CPT

## 2018-02-16 LAB
C TRACH DNA SPEC QL NAA+PROBE: NEGATIVE
N GONORRHOEA DNA SPEC QL NAA+PROBE: NEGATIVE
SPECIMEN SOURCE: NORMAL

## 2019-07-12 ENCOUNTER — OFFICE VISIT (OUTPATIENT)
Dept: URGENT CARE | Facility: CLINIC | Age: 39
End: 2019-07-12

## 2019-07-12 VITALS
DIASTOLIC BLOOD PRESSURE: 80 MMHG | BODY MASS INDEX: 25.01 KG/M2 | RESPIRATION RATE: 12 BRPM | SYSTOLIC BLOOD PRESSURE: 118 MMHG | HEART RATE: 100 BPM | HEIGHT: 68 IN | OXYGEN SATURATION: 97 % | WEIGHT: 165 LBS | TEMPERATURE: 98.9 F

## 2019-07-12 DIAGNOSIS — K29.00 ACUTE GASTRITIS WITHOUT HEMORRHAGE, UNSPECIFIED GASTRITIS TYPE: ICD-10-CM

## 2019-07-12 DIAGNOSIS — R12 HEARTBURN: ICD-10-CM

## 2019-07-12 PROCEDURE — 99214 OFFICE O/P EST MOD 30 MIN: CPT | Performed by: NURSE PRACTITIONER

## 2019-07-12 RX ORDER — OMEPRAZOLE 20 MG/1
20 CAPSULE, DELAYED RELEASE ORAL DAILY
Qty: 30 CAP | Refills: 0 | Status: SHIPPED | OUTPATIENT
Start: 2019-07-12 | End: 2021-11-27

## 2019-07-12 ASSESSMENT — PAIN SCALES - GENERAL: PAINLEVEL: 4=SLIGHT-MODERATE PAIN

## 2019-07-12 ASSESSMENT — ENCOUNTER SYMPTOMS
MYALGIAS: 1
SHORTNESS OF BREATH: 0
NAUSEA: 0
COUGH: 0
HEADACHES: 0
FEVER: 0
CONSTIPATION: 0
PALPITATIONS: 0
CHILLS: 0
BRUISES/BLEEDS EASILY: 0
BACK PAIN: 0
NERVOUS/ANXIOUS: 0
VOMITING: 0
DIARRHEA: 0

## 2019-07-12 ASSESSMENT — LIFESTYLE VARIABLES: SUBSTANCE_ABUSE: 0

## 2019-07-13 NOTE — PATIENT INSTRUCTIONS
Heartburn  Heartburn is a type of pain or discomfort that can happen in the throat or chest. It is often described as a burning pain. It may also cause a bad taste in the mouth. Heartburn may feel worse when you lie down or bend over, and it is often worse at night. Heartburn may be caused by stomach contents that move back up into the esophagus (reflux).  Follow these instructions at home:  Take these actions to decrease your discomfort and to help avoid complications.  Diet  · Follow a diet as recommended by your health care provider. This may involve avoiding foods and drinks such as:  ¨ Coffee and tea (with or without caffeine).  ¨ Drinks that contain alcohol.  ¨ Energy drinks and sports drinks.  ¨ Carbonated drinks or sodas.  ¨ Chocolate and cocoa.  ¨ Peppermint and mint flavorings.  ¨ Garlic and onions.  ¨ Horseradish.  ¨ Spicy and acidic foods, including peppers, chili powder, gutierrez powder, vinegar, hot sauces, and barbecue sauce.  ¨ Citrus fruit juices and citrus fruits, such as oranges, michael, and limes.  ¨ Tomato-based foods, such as red sauce, chili, salsa, and pizza with red sauce.  ¨ Fried and fatty foods, such as donuts, french fries, potato chips, and high-fat dressings.  ¨ High-fat meats, such as hot dogs and fatty cuts of red and white meats, such as rib eye steak, sausage, ham, and harrison.  ¨ High-fat dairy items, such as whole milk, butter, and cream cheese.  · Eat small, frequent meals instead of large meals.  · Avoid drinking large amounts of liquid with your meals.  · Avoid eating meals during the 2-3 hours before bedtime.  · Avoid lying down right after you eat.  · Do not exercise right after you eat.  General instructions  · Pay attention to any changes in your symptoms.  · Take over-the-counter and prescription medicines only as told by your health care provider. Do not take aspirin, ibuprofen, or other NSAIDs unless your health care provider told you to do so.  · Do not use any tobacco  products, including cigarettes, chewing tobacco, and e-cigarettes. If you need help quitting, ask your health care provider.  · Wear loose-fitting clothing. Do not wear anything tight around your waist that causes pressure on your abdomen.  · Raise (elevate) the head of your bed about 6 inches (15 cm).  · Try to reduce your stress, such as with yoga or meditation. If you need help reducing stress, ask your health care provider.  · If you are overweight, reduce your weight to an amount that is healthy for you. Ask your health care provider for guidance about a safe weight loss goal.  · Keep all follow-up visits as told by your health care provider. This is important.  Contact a health care provider if:  · You have new symptoms.  · You have unexplained weight loss.  · You have difficulty swallowing, or it hurts to swallow.  · You have wheezing or a persistent cough.  · Your symptoms do not improve with treatment.  · You have frequent heartburn for more than two weeks.  Get help right away if:  · You have pain in your arms, neck, jaw, teeth, or back.  · You feel sweaty, dizzy, or light-headed.  · You have chest pain or shortness of breath.  · You vomit and your vomit looks like blood or coffee grounds.  · Your stool is bloody or black.  This information is not intended to replace advice given to you by your health care provider. Make sure you discuss any questions you have with your health care provider.  Document Released: 05/06/2010 Document Revised: 05/25/2017 Document Reviewed: 04/13/2016  [employer] Interactive Patient Education © 2017 [employer]      Gastritis, Adult  Gastritis is soreness and swelling (inflammation) of the lining of the stomach. Gastritis can develop as a sudden onset (acute) or long-term (chronic) condition. If gastritis is not treated, it can lead to stomach bleeding and ulcers.  CAUSES   Gastritis occurs when the stomach lining is weak or damaged. Digestive juices from the stomach then  inflame the weakened stomach lining. The stomach lining may be weak or damaged due to viral or bacterial infections. One common bacterial infection is the Helicobacter pylori infection. Gastritis can also result from excessive alcohol consumption, taking certain medicines, or having too much acid in the stomach.   SYMPTOMS   In some cases, there are no symptoms. When symptoms are present, they may include:  · Pain or a burning sensation in the upper abdomen.  · Nausea.  · Vomiting.  · An uncomfortable feeling of fullness after eating.  DIAGNOSIS   Your caregiver may suspect you have gastritis based on your symptoms and a physical exam. To determine the cause of your gastritis, your caregiver may perform the following:  · Blood or stool tests to check for the H pylori bacterium.  · Gastroscopy. A thin, flexible tube (endoscope) is passed down the esophagus and into the stomach. The endoscope has a light and camera on the end. Your caregiver uses the endoscope to view the inside of the stomach.  · Taking a tissue sample (biopsy) from the stomach to examine under a microscope.  TREATMENT   Depending on the cause of your gastritis, medicines may be prescribed. If you have a bacterial infection, such as an H pylori infection, antibiotics may be given. If your gastritis is caused by too much acid in the stomach, H2 blockers or antacids may be given. Your caregiver may recommend that you stop taking aspirin, ibuprofen, or other nonsteroidal anti-inflammatory drugs (NSAIDs).  HOME CARE INSTRUCTIONS  · Only take over-the-counter or prescription medicines as directed by your caregiver.  · If you were given antibiotic medicines, take them as directed. Finish them even if you start to feel better.  · Drink enough fluids to keep your urine clear or pale yellow.  · Avoid foods and drinks that make your symptoms worse, such as:  ¨ Caffeine or alcoholic drinks.  ¨ Chocolate.  ¨ Peppermint or mint flavorings.  ¨ Garlic and  onions.  ¨ Spicy foods.  ¨ Citrus fruits, such as oranges, michael, or limes.  ¨ Tomato-based foods such as sauce, chili, salsa, and pizza.  ¨ Fried and fatty foods.  · Eat small, frequent meals instead of large meals.  SEEK IMMEDIATE MEDICAL CARE IF:   · You have black or dark red stools.  · You vomit blood or material that looks like coffee grounds.  · You are unable to keep fluids down.  · Your abdominal pain gets worse.  · You have a fever.  · You do not feel better after 1 week.  · You have any other questions or concerns.  MAKE SURE YOU:  · Understand these instructions.  · Will watch your condition.  · Will get help right away if you are not doing well or get worse.  This information is not intended to replace advice given to you by your health care provider. Make sure you discuss any questions you have with your health care provider.  Document Released: 12/12/2002 Document Revised: 06/18/2013 Document Reviewed: 09/10/2016  [employer]vier Interactive Patient Education © 2017 [employer]

## 2019-07-13 NOTE — PROGRESS NOTES
"Subjective:      Mariano Mata is a 38 y.o. male who presents with Abdominal Pain (epigastric pain, x3 days); Fatigue; Body Aches; Loss of Appetite; and Gastrophageal Reflux        Reviewed past medical, surgical and family history. Reviewed prescription and OTC medications with patient in electronic health record today.     No Known Allergies      HPI this is a new problem.  Mariano is a 38-year-old male patient presents with abdominal pain is epigastric area.  He has had 3 days of worsening symptoms.  He has had increased fatigue, generalized muscle aches and a loss of appetite.  Over the past 3 months he has had increased heartburn that has caused him to take Tums frequently.  He also has an adjustable bed at home and has had to sleep up right at night to help his heartburn.  He has tried over-the-counter Pepcid and Tums with mild relief of his symptoms.  He does drink alcohol 3-4 nights a week, 2-3 beers at a time \"unless it is a special occasion where I drink more\".  He denies hematochezia, melena, nausea, vomiting, change in bowel or bladder habits.  No other aggravating or alleviating factors.  He does report a 10 pound weight gain over the past year.  He also reports a decrease in nutritional foods, eating more spicy and greasy foods. His dentist told him that he may have esophageal reflux after his last dental visit ( erosion on teeth).     Review of Systems   Constitutional: Negative for chills and fever.   Respiratory: Negative for cough and shortness of breath.    Cardiovascular: Negative for chest pain and palpitations.   Gastrointestinal: Negative for constipation, diarrhea, nausea and vomiting.   Musculoskeletal: Positive for myalgias. Negative for back pain.   Neurological: Negative for headaches.   Endo/Heme/Allergies: Negative for environmental allergies. Does not bruise/bleed easily.   Psychiatric/Behavioral: Negative for substance abuse. The patient is not nervous/anxious.           Objective: " "    /80   Pulse 100   Temp 37.2 °C (98.9 °F) (Temporal)   Resp 12   Ht 1.727 m (5' 8\")   Wt 74.8 kg (165 lb)   SpO2 97%   BMI 25.09 kg/m²      Physical Exam   Constitutional: He is oriented to person, place, and time. Vital signs are normal. He appears well-developed and well-nourished. He is cooperative.  Non-toxic appearance. No distress.   HENT:   Head: Normocephalic.   Cardiovascular: Normal rate and regular rhythm.    Pulmonary/Chest: Effort normal and breath sounds normal.   Abdominal: Soft. Normal appearance and bowel sounds are normal. There is tenderness in the epigastric area. There is no rigidity, no rebound, no guarding and no CVA tenderness.       Musculoskeletal: Normal range of motion.   Neurological: He is alert and oriented to person, place, and time. He has normal reflexes.   Skin: He is not diaphoretic.   Nursing note and vitals reviewed.              Assessment/Plan:     1. Acute gastritis without hemorrhage, unspecified gastritis type    - H.PYLORI STOOL ANTIGEN; Future  - omeprazole (PRILOSEC) 20 MG delayed-release capsule; Take 1 Cap by mouth every day.  Dispense: 30 Cap; Refill: 0    2. Heartburn    - omeprazole (PRILOSEC) 20 MG delayed-release capsule; Take 1 Cap by mouth every day.  Dispense: 30 Cap; Refill: 0    Avoid greasy or fatty foods    Head elevated at night as needed discomfort    Did not lay down immediately after eating    Educated in medication use possible side effects and purpose of medications.    H. pylori stool antigen ordered.  Patient will go to the lab to collect specimen collection kit.  I will follow-up with him by phone after he obtains his test.    Encouraged a primary care provider for follow-up.  Patient says he will call for an appointment.    Keep well-hydrated    Over-the-counter Tums as needed heartburn.    Return to clinic or PCP  7-10 days if current symptoms are not resolving in a satisfactory manner or sooner if new or worsening symptoms occur. "   Patient was advised of signs and symptoms which would warrant further evaluation and /or emergent evaluation in ER.  Verbalized agreement with this treatment plan and seemed to understand without barriers. Questions were encouraged and answered to patients satisfaction.   Aftercare instructions were given to pt

## 2020-09-15 ENCOUNTER — HOSPITAL ENCOUNTER (OUTPATIENT)
Dept: LAB | Facility: MEDICAL CENTER | Age: 40
End: 2020-09-15
Attending: OBSTETRICS & GYNECOLOGY
Payer: COMMERCIAL

## 2020-09-15 LAB
HBV SURFACE AB SERPL IA-ACNC: ABNORMAL MIU/ML (ref 0–10)
HBV SURFACE AG SER QL: NORMAL
HCV AB SER QL: NORMAL
HIV 1+2 AB+HIV1 P24 AG SERPL QL IA: NORMAL
TREPONEMA PALLIDUM IGG+IGM AB [PRESENCE] IN SERUM OR PLASMA BY IMMUNOASSAY: NORMAL

## 2020-09-15 PROCEDURE — 87389 HIV-1 AG W/HIV-1&-2 AB AG IA: CPT

## 2020-09-15 PROCEDURE — 87340 HEPATITIS B SURFACE AG IA: CPT

## 2020-09-15 PROCEDURE — 87491 CHLMYD TRACH DNA AMP PROBE: CPT

## 2020-09-15 PROCEDURE — 86803 HEPATITIS C AB TEST: CPT

## 2020-09-15 PROCEDURE — 36415 COLL VENOUS BLD VENIPUNCTURE: CPT

## 2020-09-15 PROCEDURE — 87591 N.GONORRHOEAE DNA AMP PROB: CPT

## 2020-09-15 PROCEDURE — 86706 HEP B SURFACE ANTIBODY: CPT

## 2020-09-15 PROCEDURE — 86780 TREPONEMA PALLIDUM: CPT

## 2021-01-12 ENCOUNTER — HOSPITAL ENCOUNTER (OUTPATIENT)
Dept: LAB | Facility: MEDICAL CENTER | Age: 41
End: 2021-01-12
Payer: COMMERCIAL

## 2021-01-12 LAB
BASOPHILS # BLD AUTO: 1.6 % (ref 0–1.8)
BASOPHILS # BLD: 0.06 K/UL (ref 0–0.12)
EOSINOPHIL # BLD AUTO: 0.17 K/UL (ref 0–0.51)
EOSINOPHIL NFR BLD: 4.7 % (ref 0–6.9)
ERYTHROCYTE [DISTWIDTH] IN BLOOD BY AUTOMATED COUNT: 42 FL (ref 35.9–50)
HCT VFR BLD AUTO: 48.1 % (ref 42–52)
HGB BLD-MCNC: 16.2 G/DL (ref 14–18)
IMM GRANULOCYTES # BLD AUTO: 0.01 K/UL (ref 0–0.11)
IMM GRANULOCYTES NFR BLD AUTO: 0.3 % (ref 0–0.9)
LYMPHOCYTES # BLD AUTO: 1.49 K/UL (ref 1–4.8)
LYMPHOCYTES NFR BLD: 40.9 % (ref 22–41)
MCH RBC QN AUTO: 30.9 PG (ref 27–33)
MCHC RBC AUTO-ENTMCNC: 33.7 G/DL (ref 33.7–35.3)
MCV RBC AUTO: 91.8 FL (ref 81.4–97.8)
MONOCYTES # BLD AUTO: 0.43 K/UL (ref 0–0.85)
MONOCYTES NFR BLD AUTO: 11.8 % (ref 0–13.4)
NEUTROPHILS # BLD AUTO: 1.48 K/UL (ref 1.82–7.42)
NEUTROPHILS NFR BLD: 40.7 % (ref 44–72)
NRBC # BLD AUTO: 0 K/UL
NRBC BLD-RTO: 0 /100 WBC
PLATELET # BLD AUTO: 275 K/UL (ref 164–446)
PMV BLD AUTO: 10 FL (ref 9–12.9)
RBC # BLD AUTO: 5.24 M/UL (ref 4.7–6.1)
WBC # BLD AUTO: 3.6 K/UL (ref 4.8–10.8)

## 2021-01-12 PROCEDURE — 82728 ASSAY OF FERRITIN: CPT

## 2021-01-12 PROCEDURE — 85025 COMPLETE CBC W/AUTO DIFF WBC: CPT

## 2021-01-12 PROCEDURE — 82306 VITAMIN D 25 HYDROXY: CPT

## 2021-01-12 PROCEDURE — 80053 COMPREHEN METABOLIC PANEL: CPT

## 2021-01-12 PROCEDURE — 82746 ASSAY OF FOLIC ACID SERUM: CPT

## 2021-01-12 PROCEDURE — 82607 VITAMIN B-12: CPT

## 2021-01-12 PROCEDURE — 84443 ASSAY THYROID STIM HORMONE: CPT

## 2021-01-12 PROCEDURE — 36415 COLL VENOUS BLD VENIPUNCTURE: CPT

## 2021-01-13 LAB
25(OH)D3 SERPL-MCNC: 26 NG/ML (ref 30–100)
ALBUMIN SERPL BCP-MCNC: 4.5 G/DL (ref 3.2–4.9)
ALBUMIN/GLOB SERPL: 1.7 G/DL
ALP SERPL-CCNC: 54 U/L (ref 30–99)
ALT SERPL-CCNC: 21 U/L (ref 2–50)
ANION GAP SERPL CALC-SCNC: 15 MMOL/L (ref 7–16)
AST SERPL-CCNC: 26 U/L (ref 12–45)
BILIRUB SERPL-MCNC: 0.5 MG/DL (ref 0.1–1.5)
BUN SERPL-MCNC: 14 MG/DL (ref 8–22)
CALCIUM SERPL-MCNC: 9.3 MG/DL (ref 8.5–10.5)
CHLORIDE SERPL-SCNC: 104 MMOL/L (ref 96–112)
CO2 SERPL-SCNC: 19 MMOL/L (ref 20–33)
CREAT SERPL-MCNC: 0.84 MG/DL (ref 0.5–1.4)
FERRITIN SERPL-MCNC: 478 NG/ML (ref 22–322)
FOLATE SERPL-MCNC: 21.9 NG/ML
GLOBULIN SER CALC-MCNC: 2.7 G/DL (ref 1.9–3.5)
GLUCOSE SERPL-MCNC: 79 MG/DL (ref 65–99)
POTASSIUM SERPL-SCNC: 4.2 MMOL/L (ref 3.6–5.5)
PROT SERPL-MCNC: 7.2 G/DL (ref 6–8.2)
SODIUM SERPL-SCNC: 138 MMOL/L (ref 135–145)
TSH SERPL DL<=0.005 MIU/L-ACNC: 3.38 UIU/ML (ref 0.38–5.33)
VIT B12 SERPL-MCNC: 1344 PG/ML (ref 211–911)

## 2021-03-11 ENCOUNTER — HOSPITAL ENCOUNTER (OUTPATIENT)
Dept: LAB | Facility: MEDICAL CENTER | Age: 41
End: 2021-03-11
Payer: COMMERCIAL

## 2021-03-11 LAB
CHOLEST SERPL-MCNC: 261 MG/DL (ref 100–199)
FASTING STATUS PATIENT QL REPORTED: NORMAL
FERRITIN SERPL-MCNC: 503 NG/ML (ref 22–322)
HDLC SERPL-MCNC: 56 MG/DL
LDLC SERPL CALC-MCNC: 178 MG/DL
TRIGL SERPL-MCNC: 135 MG/DL (ref 0–149)

## 2021-03-11 PROCEDURE — 36415 COLL VENOUS BLD VENIPUNCTURE: CPT

## 2021-03-11 PROCEDURE — 80061 LIPID PANEL: CPT

## 2021-03-11 PROCEDURE — 82728 ASSAY OF FERRITIN: CPT

## 2021-04-16 ENCOUNTER — HOSPITAL ENCOUNTER (OUTPATIENT)
Dept: LAB | Facility: MEDICAL CENTER | Age: 41
End: 2021-04-16
Payer: COMMERCIAL

## 2021-04-16 LAB
FERRITIN SERPL-MCNC: 461 NG/ML (ref 22–322)
IRON SATN MFR SERPL: 53 % (ref 15–55)
IRON SERPL-MCNC: 133 UG/DL (ref 50–180)
TIBC SERPL-MCNC: 253 UG/DL (ref 250–450)
TSH SERPL DL<=0.005 MIU/L-ACNC: 1.84 UIU/ML (ref 0.38–5.33)
UIBC SERPL-MCNC: 120 UG/DL (ref 110–370)

## 2021-04-16 PROCEDURE — 82728 ASSAY OF FERRITIN: CPT

## 2021-04-16 PROCEDURE — 81256 HFE GENE: CPT

## 2021-04-16 PROCEDURE — 83550 IRON BINDING TEST: CPT

## 2021-04-16 PROCEDURE — 36415 COLL VENOUS BLD VENIPUNCTURE: CPT

## 2021-04-16 PROCEDURE — 84443 ASSAY THYROID STIM HORMONE: CPT

## 2021-04-16 PROCEDURE — 83540 ASSAY OF IRON: CPT

## 2021-04-21 LAB
HFE GENE MUT ANL BLD/T: NORMAL
HFE P.C282Y BLD/T QL: NEGATIVE
HFE P.H63D BLD/T QL: NORMAL
HFE P.S65C BLD/T QL: NEGATIVE

## 2021-05-21 ENCOUNTER — HOSPITAL ENCOUNTER (OUTPATIENT)
Dept: LAB | Facility: MEDICAL CENTER | Age: 41
End: 2021-05-21
Attending: STUDENT IN AN ORGANIZED HEALTH CARE EDUCATION/TRAINING PROGRAM
Payer: COMMERCIAL

## 2021-05-21 LAB
CRP SERPL HS-MCNC: <0.3 MG/DL (ref 0–0.75)
ERYTHROCYTE [SEDIMENTATION RATE] IN BLOOD BY WESTERGREN METHOD: 5 MM/HOUR (ref 0–20)

## 2021-05-21 PROCEDURE — 86140 C-REACTIVE PROTEIN: CPT

## 2021-05-21 PROCEDURE — 85652 RBC SED RATE AUTOMATED: CPT

## 2021-05-21 PROCEDURE — 36415 COLL VENOUS BLD VENIPUNCTURE: CPT

## 2021-11-27 ENCOUNTER — TELEMEDICINE (OUTPATIENT)
Dept: TELEHEALTH | Facility: TELEMEDICINE | Age: 41
End: 2021-11-27
Payer: COMMERCIAL

## 2021-11-27 DIAGNOSIS — J01.41 RECURRENT PANSINUSITIS: ICD-10-CM

## 2021-11-27 DIAGNOSIS — R51.9 SINUS HEADACHE: ICD-10-CM

## 2021-11-27 DIAGNOSIS — J45.40 MODERATE PERSISTENT ASTHMA WITHOUT COMPLICATION: ICD-10-CM

## 2021-11-27 PROBLEM — R20.8 OTHER DISTURBANCES OF SKIN SENSATION: Status: ACTIVE | Noted: 2020-12-01

## 2021-11-27 PROBLEM — Z83.49 FAMILY HISTORY OF DISORDER OF METABOLISM: Status: ACTIVE | Noted: 2021-01-06

## 2021-11-27 PROBLEM — R53.83 FATIGUE: Status: ACTIVE | Noted: 2020-12-01

## 2021-11-27 PROBLEM — Z71.89 OTHER SPECIFIED COUNSELING: Status: ACTIVE | Noted: 2021-03-16

## 2021-11-27 PROBLEM — E78.00 HYPERCHOLESTEROLEMIA: Status: ACTIVE | Noted: 2021-03-16

## 2021-11-27 PROBLEM — R79.89 HIGH SERUM FERRITIN: Status: ACTIVE | Noted: 2021-03-16

## 2021-11-27 PROBLEM — H93.19 TINNITUS: Status: ACTIVE | Noted: 2021-01-06

## 2021-11-27 PROCEDURE — 99214 OFFICE O/P EST MOD 30 MIN: CPT | Mod: 95 | Performed by: PHYSICIAN ASSISTANT

## 2021-11-27 RX ORDER — SIMVASTATIN 20 MG
TABLET ORAL
COMMUNITY
Start: 2021-10-17 | End: 2022-10-13 | Stop reason: SDUPTHER

## 2021-11-27 RX ORDER — PREDNISONE 20 MG/1
TABLET ORAL
Qty: 20 TABLET | Refills: 0 | Status: SHIPPED
Start: 2021-11-27 | End: 2022-03-28

## 2021-11-27 RX ORDER — DOXYCYCLINE HYCLATE 100 MG
100 TABLET ORAL 2 TIMES DAILY
Qty: 20 TABLET | Refills: 0 | Status: SHIPPED | OUTPATIENT
Start: 2021-11-27 | End: 2021-12-07

## 2021-11-27 NOTE — PROGRESS NOTES
Telemedicine Visit: Established Patient     This evaluation was conducted via Zoom using secure and encrypted videoconferencing technology. The patient was in a private location in the state [facility].    The patient's identity was confirmed and verbal consent was obtained for this virtual visit.    Subjective:   CC: sinus congestion, recurrent sinusitis x 2 months  Mariano Mata is a 41 y.o. male presenting for evaluation and management of sinus infection. Patient states he has had sinus pain, pressure, purulent nasal discharge x2 months. Patient has taken a course of cephalexin 4 times daily x2 weeks with some improvement without resolution. Patient has a history of moderate persistent asthma, is on albuterol inhaler, Advair Diskus, daily allergy medicine with no improvement of nasal congestion symptoms. Patient has had Covid vaccinations, not concerned about Covid at this time due to persistence and length of time of symptoms. Patient has a history of sinusitis in the past. Patient is interested in getting a referral to ear nose and throat for a more thorough investigation of his recurrent sinusitis. Patient denies any other complaint.    ROS : Patient denies dizziness, chest pain, shortness of breath, nausea vomiting or diarrhea.    Current medicines (including changes today)  Medications:    • albuterol Aers  • cetirizine Tabs  • DAYQUIL PO  • fluticasone-salmeterol Aepb  • omeprazole  • ONE-A-DAY [facility] FORMULA PO  • PROBIOTIC & ACIDOPHILUS EX ST PO    Allergies: Patient has no known allergies.    Problem List: Mariano Mata does not have any pertinent problems on file.    Surgical History:  No past surgical history on file.    Past Social Hx: Mariano Mata  reports that he has never smoked. He has never used smokeless tobacco. He reports current alcohol use of about 4.2 oz of alcohol per week. He reports that he does not use drugs.     Past Family Hx:  Mariano Mata family history includes Heart Disease  in his father; No Known Problems in his mother.     Problem list, medications, and allergies reviewed by myself today in Epic.     Objective:   There were no vitals taken for this visit. Not taken due to virtual visit.    Physical Exam:  Constitutional: Alert, no distress, well-groomed.  Skin: No rashes in visible areas.  Eye: Round. Conjunctiva clear, lids normal. No icterus.   ENMT: Lips pink without lesions, good dentition, moist mucous membranes. Phonation normal.Nasal Congestion, unable to breath through nostrils.   Neck: No masses, no thyromegaly. Moves freely without pain.  CV: Pulse as reported by patient is normal  Respiratory: Unlabored respiratory effort, no cough or audible wheeze. Dry cough.   Psych: Alert and oriented x3, normal affect and mood.       Assessment and Plan:   The following treatment plan was discussed:     1. Recurrent pansinusitis  doxycycline (VIBRAMYCIN) 100 MG Tab    predniSONE (DELTASONE) 20 MG Tab    Referral to ENT   2. Moderate persistent asthma without complication  doxycycline (VIBRAMYCIN) 100 MG Tab    predniSONE (DELTASONE) 20 MG Tab    Referral to ENT   3. Sinus headache  doxycycline (VIBRAMYCIN) 100 MG Tab    predniSONE (DELTASONE) 20 MG Tab    Referral to ENT     PT to begin prescription medications today as discussed for sinusitis.     PT can begin or continue OTC medications, increase fluids and rest until symptoms improve.     PT to continue taking prescription medications as prescribed.      Referral to ENT placed in pt chart.     PT should follow up with PCP in 1-2 days for re-evaluation if symptoms have not improved.      Discussed red flags and reasons to return to UC or ED.      Pt and/or family verbalized understanding of diagnosis and follow up instructions and was offered informational handout on diagnosis.  PT discharged.       I have spent at least 30 minutes on the care of this patient.  This includes preparing for visit which includes review of previous  visits if available in EMR, obtaining HPI, exam and evaluation of patient, ordering and independent interpretation of labs, imaging, tests, medical management, counseling, education and documentation.          Follow-up: Return if symptoms worsen or fail to improve.

## 2022-03-28 ENCOUNTER — TELEPHONE (OUTPATIENT)
Dept: SCHEDULING | Facility: IMAGING CENTER | Age: 42
End: 2022-03-28

## 2022-03-28 ENCOUNTER — TELEMEDICINE (OUTPATIENT)
Dept: MEDICAL GROUP | Facility: IMAGING CENTER | Age: 42
End: 2022-03-28
Payer: COMMERCIAL

## 2022-03-28 DIAGNOSIS — Z13.29 SCREENING FOR THYROID DISORDER: ICD-10-CM

## 2022-03-28 DIAGNOSIS — Z82.49 FAMILY HISTORY OF HEART ATTACK: ICD-10-CM

## 2022-03-28 DIAGNOSIS — Z13.1 DIABETES MELLITUS SCREENING: ICD-10-CM

## 2022-03-28 DIAGNOSIS — J45.20 MILD INTERMITTENT ASTHMA WITHOUT COMPLICATION: ICD-10-CM

## 2022-03-28 DIAGNOSIS — E83.110 HEREDITARY HEMOCHROMATOSIS (HCC): ICD-10-CM

## 2022-03-28 DIAGNOSIS — Z76.89 ENCOUNTER TO ESTABLISH CARE: ICD-10-CM

## 2022-03-28 DIAGNOSIS — R79.89 HIGH SERUM FERRITIN: ICD-10-CM

## 2022-03-28 DIAGNOSIS — Z13.21 ENCOUNTER FOR VITAMIN DEFICIENCY SCREENING: ICD-10-CM

## 2022-03-28 DIAGNOSIS — E78.00 HYPERCHOLESTEROLEMIA: ICD-10-CM

## 2022-03-28 DIAGNOSIS — Z13.79 GENETIC TESTING: ICD-10-CM

## 2022-03-28 DIAGNOSIS — S52.124D CLOSED NONDISPLACED FRACTURE OF HEAD OF RIGHT RADIUS WITH ROUTINE HEALING: ICD-10-CM

## 2022-03-28 PROBLEM — Z71.89 OTHER SPECIFIED COUNSELING: Status: RESOLVED | Noted: 2021-03-16 | Resolved: 2022-03-28

## 2022-03-28 PROBLEM — J45.909 MODERATE ASTHMA WITHOUT COMPLICATION: Status: RESOLVED | Noted: 2018-01-17 | Resolved: 2022-03-28

## 2022-03-28 PROBLEM — E87.6 HYPOKALEMIA: Status: RESOLVED | Noted: 2017-08-21 | Resolved: 2022-03-28

## 2022-03-28 PROBLEM — H93.19 TINNITUS: Status: RESOLVED | Noted: 2021-01-06 | Resolved: 2022-03-28

## 2022-03-28 PROCEDURE — 99214 OFFICE O/P EST MOD 30 MIN: CPT | Performed by: PHYSICIAN ASSISTANT

## 2022-03-28 RX ORDER — ALBUTEROL SULFATE 90 UG/1
2 AEROSOL, METERED RESPIRATORY (INHALATION) EVERY 6 HOURS PRN
Qty: 8.5 G | Refills: 5 | Status: SHIPPED | OUTPATIENT
Start: 2022-03-28 | End: 2022-09-07 | Stop reason: SDUPTHER

## 2022-03-28 ASSESSMENT — PATIENT HEALTH QUESTIONNAIRE - PHQ9: CLINICAL INTERPRETATION OF PHQ2 SCORE: 0

## 2022-03-29 NOTE — ASSESSMENT & PLAN NOTE
Patient with a history of elevated ferritin with work-up showing heterozygous for hemochromatosis.  Inflammatory markers negative.  Also with high cholesterol.  Now on statin.

## 2022-03-29 NOTE — ASSESSMENT & PLAN NOTE
Patient tripped and fell on 3/26/22 and fractured his right radial head.  He went to the emergency department and had a splint placed.  He is requesting orthopedic referral.

## 2022-03-29 NOTE — ASSESSMENT & PLAN NOTE
Ref. Range 4/16/2021 08:22   Iron Latest Ref Range: 50 - 180 ug/dL 133   Total Iron Binding Latest Ref Range: 250 - 450 ug/dL 253   % Saturation Latest Ref Range: 15 - 55 % 53   Unsat Iron Binding Latest Ref Range: 110 - 370 ug/dL 120   C282Y Mutation Unknown Negative   H63D Mutation Unknown Heterozygous   S65C Mutation Unknown Negative   Hemochrom Interp Unknown See Note

## 2022-03-29 NOTE — PATIENT INSTRUCTIONS
It was a pleasure meeting with you today at [facility]!    Your medical history/records and medications were reviewed today.     If you have any prescription refill requests, please send them via [employer] or discuss with your provider at the start of your office visits. Please allow 3-5 business days for lab and testing review and you will be contacted via [employer] with those results, or if advised to make a follow up appointment regarding those results, then please do so.     Once resulted, your lab/test/imaging results will show up automatically in your MyChart. Please wait for my interpretation and recommendations prior to viewing your results to avoid any unnecessary confusion or misinterpretation. I will address all of the lab values that I interpret as abnormal and message you accordingly on your MyChart. I will always send you a message about your results even if they are normal. If you do not hear back from me within 5-7 business days after completing your tests, then please send me a message on [employer] so I can obtain your results (especially if you went to an outside lab or imaging center - [facility], [facility], etc).     If you have any additional questions or concerns beyond my interpretation of your results, please make an appointment with me to discuss in further detail.    Please only use the [employer] messaging system for questions regarding your most recent appointment or if advised to use otherwise (glucose or blood pressure reporting).     If you have any new problems or concerns, you must make an appointment to discuss. This includes any referral requests, lab requests (unless advised to notify me for pre-appt labs), medication side effects, or request for medication adjustments.       Thank you,    Rachael Pantoja PA-C ([place])  Physician Assistant Certified  [facility]

## 2022-03-29 NOTE — PROGRESS NOTES
Virtual Visit: Established Patient   This visit was conducted via Zoom using secure and encrypted videoconferencing technology. The patient was in a private location in the state of [place].    The patient's identity was confirmed and verbal consent was obtained for this virtual visit.    Subjective:   CC:   Chief Complaint   Patient presents with   • Establish Care       Mariano Mata is a 41 y.o. male presenting for evaluation and management of:    Hereditary hemochromatosis (HCC)     Ref. Range 2021 08:22   Iron Latest Ref Range: 50 - 180 ug/dL 133   Total Iron Binding Latest Ref Range: 250 - 450 ug/dL 253   % Saturation Latest Ref Range: 15 - 55 % 53   Unsat Iron Binding Latest Ref Range: 110 - 370 ug/dL 120   C282Y Mutation Unknown Negative   H63D Mutation Unknown Heterozygous   S65C Mutation Unknown Negative   Hemochrom Interp Unknown See Note       Family history of heart attack  Patient states that his father had a heart attack at age 56 and .    Mild intermittent asthma without complication  Chronic, uses albuterol 2 to 3 days a week.  Has seasonal allergies and takes an over-the-counter antihistamine.  No steroid nasal sprays.    Hypercholesterolemia  Chronic, now on simvastatin 20 mg daily.     Ref. Range 2018 11:51 2021 07:19 3/11/2021 07:15   Cholesterol,Tot Latest Ref Range: 100 - 199 mg/dL 225 (H)  261 (H)   Triglycerides Latest Ref Range: 0 - 149 mg/dL 95  135   HDL Latest Ref Range: >=40 mg/dL 60  56   LDL Latest Ref Range: <100 mg/dL 146 (H)  178 (H)       Closed nondisplaced fracture of head of right radius with routine healing  Patient tripped and fell on 3/26/22 and fractured his right radial head.  He went to the emergency department and had a splint placed.  He is requesting orthopedic referral.    High serum ferritin  Patient with a history of elevated ferritin with work-up showing heterozygous for hemochromatosis.  Inflammatory markers negative.  Also with high  cholesterol.  Now on statin.    Depression Screening    Little interest or pleasure in doing things?  0 - not at all   Feeling down, depressed , or hopeless? 0 - not at all     ROS   Denies any recent fevers or chills. No nausea or vomiting. No chest pains or shortness of breath.     No Known Allergies    Current medicines (including changes today)  Current Outpatient Medications   Medication Sig Dispense Refill   • albuterol 108 (90 Base) MCG/ACT Aero Soln inhalation aerosol Inhale 2 Puffs every 6 hours as needed for Shortness of Breath. 8.5 g 5   • simvastatin (ZOCOR) 20 MG Tab      • Probiotic Product (PROBIOTIC & ACIDOPHILUS EX ST PO) Take 1 Tab by mouth every day.     • Multiple Vitamins-Minerals (ONE-A-DAY MENS HEALTH FORMULA PO) Take 1 Tab by mouth every day.     • cetirizine (ZYRTEC) 10 MG Tab Take 10 mg by mouth every day.       No current facility-administered medications for this visit.       Patient Active Problem List    Diagnosis Date Noted   • Closed nondisplaced fracture of head of right radius with routine healing 2022   • Family history of heart attack 2022   • Hereditary hemochromatosis (HCC) 2022   • High serum ferritin 2021   • Hypercholesterolemia 2021   • Family history of disorder of metabolism 2021   • Fatigue 2020   • Other disturbances of skin sensation 2020   • Genital herpes 2016   • Mild intermittent asthma without complication 2016       Family History   Problem Relation Age of Onset   • Heart Disease Father         Pt father  of MI in 50s   • Hyperlipidemia Father    • No Known Problems Mother    • Cancer Sister 38        brain cancer - GBM       He  has a past medical history of Asthma and Seasonal allergies.  He  has no past surgical history on file.         Objective:   There were no vitals taken for this visit.  RR 12    Physical Exam:  Constitutional: Alert, no distress, well-groomed.  Skin: No rashes in visible  areas.  Eye: Round. Conjunctiva clear, lids normal. No icterus.   ENMT: Lips pink without lesions, good dentition, moist mucous membranes. Phonation normal.  Neck: No masses, no thyromegaly. Moves freely without pain.  Respiratory: Unlabored respiratory effort, no cough or audible wheeze  Psych: Alert and oriented x3, normal affect and mood.     Assessment and Plan:   The following treatment plan was discussed:     1. Encounter to establish care  Pleasant 41-year-old male establishing care via Zoom.  History reviewed.  Previous records and labs reviewed with patient.    2. Closed nondisplaced fracture of head of right radius with routine healing  Acute, splints in place.  Will place urgent referral for orthopedics for evaluation.  If there is a delay, I advised patient to go to the [facility] urgent care for further management.    3. High serum ferritin  With testing positive for heterozygous hemochromatosis.  We will recheck ferritin.  May be multifactorial due to elevated cholesterol.  - CBC WITH DIFFERENTIAL; Future  - FERRITIN; Future  - GAMMA GT (GGT); Future  - IRON/TOTAL IRON BIND; Future  - APOLIPOPROTEIN B; Future    4. Hypercholesterolemia  Chronic, on simvastatin 20 mg daily.  Rule out familial hypercholesterolemia.  - Referral to Genetic Research Studies  - Lipid Profile; Future  - APOLIPOPROTEIN B; Future    5. Hereditary hemochromatosis (HCC)  Heterozygous for H63D mutation.  Elevated ferritin and percent saturation.  - CBC WITH DIFFERENTIAL; Future  - FERRITIN; Future  - IRON/TOTAL IRON BIND; Future    6. Mild intermittent asthma without complication  Chronic, controlled.  Refill albuterol per patient request.  - albuterol 108 (90 Base) MCG/ACT Aero Soln inhalation aerosol; Inhale 2 Puffs every 6 hours as needed for Shortness of Breath.  Dispense: 8.5 g; Refill: 5    7. Family history of heart attack  - Lipid Profile; Future  - APOLIPOPROTEIN B; Future    8. Genetic testing  - Referral to Genetic Research  Studies    9. Screening for thyroid disorder  - FREE THYROXINE; Future  - TSH; Future    10. Diabetes mellitus screening  - Comp Metabolic Panel; Future  - HEMOGLOBIN A1C; Future    11. Encounter for vitamin deficiency screening  - VITAMIN B12; Future  - VITAMIN D,25 HYDROXY; Future      Follow-up: Return for Will notify patient to follow-up pending tests.         Rachael Renee) Kit DALY  Physician Assistant Certified  [facility]    Please note that this dictation was created using voice recognition software. I have made every reasonable attempt to correct obvious errors, but I expect that there are errors of grammar and possibly content that I did not discover before finalizing the note.

## 2022-03-29 NOTE — ASSESSMENT & PLAN NOTE
Chronic, uses albuterol 2 to 3 days a week.  Has seasonal allergies and takes an over-the-counter antihistamine.  No steroid nasal sprays.

## 2022-03-29 NOTE — ASSESSMENT & PLAN NOTE
Chronic, now on simvastatin 20 mg daily.     Ref. Range 1/22/2018 11:51 1/12/2021 07:19 3/11/2021 07:15   Cholesterol,Tot Latest Ref Range: 100 - 199 mg/dL 225 (H)  261 (H)   Triglycerides Latest Ref Range: 0 - 149 mg/dL 95  135   HDL Latest Ref Range: >=40 mg/dL 60  56   LDL Latest Ref Range: <100 mg/dL 146 (H)  178 (H)

## 2022-09-07 ENCOUNTER — PATIENT MESSAGE (OUTPATIENT)
Dept: MEDICAL GROUP | Facility: IMAGING CENTER | Age: 42
End: 2022-09-07
Payer: COMMERCIAL

## 2022-09-07 DIAGNOSIS — J45.20 MILD INTERMITTENT ASTHMA WITHOUT COMPLICATION: ICD-10-CM

## 2022-09-07 RX ORDER — ALBUTEROL SULFATE 90 UG/1
2 AEROSOL, METERED RESPIRATORY (INHALATION) EVERY 6 HOURS PRN
Qty: 8.5 G | Refills: 0 | Status: SHIPPED | OUTPATIENT
Start: 2022-09-07 | End: 2022-09-26 | Stop reason: SDUPTHER

## 2022-09-07 NOTE — PATIENT COMMUNICATION
Received request via: Patient    Was the patient seen in the last year in this department? Yes  Last ov 3/28/22    Does the patient have an active prescription (recently filled or refills available) for medication(s) requested?  Patient is requesting change of pharmacy    Requested Prescriptions     Pending Prescriptions Disp Refills    albuterol 108 (90 Base) MCG/ACT Aero Soln inhalation aerosol 8.5 g 0     Sig: Inhale 2 Puffs every 6 hours as needed for Shortness of Breath.

## 2022-09-26 ENCOUNTER — TELEMEDICINE (OUTPATIENT)
Dept: MEDICAL GROUP | Facility: IMAGING CENTER | Age: 42
End: 2022-09-26
Payer: COMMERCIAL

## 2022-09-26 VITALS — BODY MASS INDEX: 25.76 KG/M2 | WEIGHT: 170 LBS | HEIGHT: 68 IN

## 2022-09-26 DIAGNOSIS — J01.41 ACUTE RECURRENT PANSINUSITIS: ICD-10-CM

## 2022-09-26 DIAGNOSIS — J45.20 MILD INTERMITTENT ASTHMA WITHOUT COMPLICATION: ICD-10-CM

## 2022-09-26 PROCEDURE — 99214 OFFICE O/P EST MOD 30 MIN: CPT | Mod: 95 | Performed by: PHYSICIAN ASSISTANT

## 2022-09-26 RX ORDER — DOXYCYCLINE HYCLATE 100 MG
100 TABLET ORAL 2 TIMES DAILY
Qty: 20 TABLET | Refills: 0 | Status: SHIPPED | OUTPATIENT
Start: 2022-09-26 | End: 2022-10-06

## 2022-09-26 RX ORDER — ALBUTEROL SULFATE 90 UG/1
2 AEROSOL, METERED RESPIRATORY (INHALATION) EVERY 6 HOURS PRN
Qty: 8.5 G | Refills: 0 | Status: SHIPPED | OUTPATIENT
Start: 2022-09-26 | End: 2022-12-05

## 2022-09-26 RX ORDER — METHYLPREDNISOLONE 4 MG/1
TABLET ORAL
Qty: 21 TABLET | Refills: 0 | Status: SHIPPED | OUTPATIENT
Start: 2022-09-26 | End: 2023-06-29

## 2022-09-26 RX ORDER — MONTELUKAST SODIUM 10 MG/1
10 TABLET ORAL DAILY
Qty: 90 TABLET | Refills: 1 | Status: SHIPPED | OUTPATIENT
Start: 2022-09-26 | End: 2023-08-31 | Stop reason: SDUPTHER

## 2022-09-26 ASSESSMENT — PAIN SCALES - GENERAL: PAINLEVEL: NO PAIN

## 2022-09-26 ASSESSMENT — FIBROSIS 4 INDEX: FIB4 SCORE: 0.87

## 2022-09-26 NOTE — ASSESSMENT & PLAN NOTE
Chronic, using his albuterol daily now. Symptoms worse with allergies and wildfire smoke. No h/o steroid inhaler use.

## 2022-09-26 NOTE — PROGRESS NOTES
Virtual Visit: Established Patient   This visit was conducted via Zoom using secure and encrypted videoconferencing technology. The patient was in a private location in the state of [place].    The patient's identity was confirmed and verbal consent was obtained for this virtual visit.    Subjective:   CC:   Chief Complaint   Patient presents with    Sinus Problem     Pressure, congestion x4days        Mariano Mata is a 41 y.o. male presenting for evaluation and management of:    Acute recurrent pansinusitis  Pt admits to sinus pressure and thick nasal drainage x 3-4 days. Using flonase daily and mucinex. Has been on doxycycline 100 mg bid x 2 days - had an old prescription that had 5 pills left. Cannot tolerate sinus rinses. Never saw ENT, but would like a renewed referral, as he states that he has been getting recurrent sinus infections every 3-4 months for the past two years. No fever.      Mild intermittent asthma without complication  Chronic, using his albuterol daily now. Symptoms worse with allergies and wildfire smoke. No h/o steroid inhaler use.     ROS   Denies any recent fevers or chills. No nausea or vomiting. No chest pains or shortness of breath.     No Known Allergies    Current medicines (including changes today)  Current Outpatient Medications   Medication Sig Dispense Refill    doxycycline (VIBRAMYCIN) 100 MG Tab Take 1 Tablet by mouth 2 times a day for 10 days. 20 Tablet 0    methylPREDNISolone (MEDROL DOSEPAK) 4 MG Tablet Therapy Pack As directed on the packaging label. 21 Tablet 0    albuterol 108 (90 Base) MCG/ACT Aero Soln inhalation aerosol Inhale 2 Puffs every 6 hours as needed for Shortness of Breath. 8.5 g 0    montelukast (SINGULAIR) 10 MG Tab Take 1 Tablet by mouth every day. 90 Tablet 1    simvastatin (ZOCOR) 20 MG Tab       Probiotic Product (PROBIOTIC & ACIDOPHILUS EX ST PO) Take 1 Tab by mouth every day.      Multiple Vitamins-Minerals (ONE-A-DAY MENS HEALTH FORMULA PO) Take 1 Tab  "by mouth every day.      cetirizine (ZYRTEC) 10 MG Tab Take 10 mg by mouth every day.       No current facility-administered medications for this visit.       Patient Active Problem List    Diagnosis Date Noted    Acute recurrent pansinusitis 2022    Closed nondisplaced fracture of head of right radius with routine healing 2022    Family history of heart attack 2022    Hereditary hemochromatosis (HCC) 2022    High serum ferritin 2021    Hypercholesterolemia 2021    Family history of disorder of metabolism 2021    Fatigue 2020    Other disturbances of skin sensation 2020    Genital herpes 2016    Mild intermittent asthma without complication 2016       Family History   Problem Relation Age of Onset    Heart Disease Father         Pt father  of MI in 50s    Hyperlipidemia Father     No Known Problems Mother     Cancer Sister 38        brain cancer - GBM       He  has a past medical history of Asthma and Seasonal allergies.  He  has no past surgical history on file.         Objective:   Ht 1.727 m (5' 8\")   Wt 77.1 kg (170 lb) Comment: pt stated  BMI 25.85 kg/m²   RR 12    Physical Exam:  Constitutional: Alert, no distress, well-groomed.  Skin: No rashes in visible areas.  Eye: Round. Conjunctiva clear, lids normal. No icterus.   ENMT: Lips pink without lesions, good dentition, moist mucous membranes. Phonation normal.  Neck: No masses, no thyromegaly. Moves freely without pain.  Respiratory: Unlabored respiratory effort, no cough or audible wheeze  Psych: Alert and oriented x3, normal affect and mood.     Assessment and Plan:   The following treatment plan was discussed:     1. Acute recurrent pansinusitis  Acute vs Acute on Chronic - check imaging. See orders below. Follow up if fever or worsening symptoms despite prescription medications.   - Referral to ENT  - doxycycline (VIBRAMYCIN) 100 MG Tab; Take 1 Tablet by mouth 2 times a day for 10 " days.  Dispense: 20 Tablet; Refill: 0  - DX-SINUSES-PARANASAL COMPLETE 3+; Future  - methylPREDNISolone (MEDROL DOSEPAK) 4 MG Tablet Therapy Pack; As directed on the packaging label.  Dispense: 21 Tablet; Refill: 0    2. Mild intermittent asthma without complication  Chronic, uncontrolled. Add Singulair 10 mg daily. Reassess at physical.   - albuterol 108 (90 Base) MCG/ACT Aero Soln inhalation aerosol; Inhale 2 Puffs every 6 hours as needed for Shortness of Breath.  Dispense: 8.5 g; Refill: 0  - montelukast (SINGULAIR) 10 MG Tab; Take 1 Tablet by mouth every day.  Dispense: 90 Tablet; Refill: 1      Follow-up: Return in about 6 weeks (around 11/7/2022) for Annual physical.         Rachael Pantoja PA-C ([place])  Physician Assistant Certified  [facility]    Please note that this dictation was created using voice recognition software. I have made every reasonable attempt to correct obvious errors, but I expect that there are errors of grammar and possibly content that I did not discover before finalizing the note.

## 2022-09-26 NOTE — ASSESSMENT & PLAN NOTE
Pt admits to sinus pressure and thick nasal drainage x 3-4 days. Using flonase daily and mucinex. Has been on doxycycline 100 mg bid x 2 days - had an old prescription that had 5 pills left. Cannot tolerate sinus rinses. Never saw ENT, but would like a renewed referral, as he states that he has been getting recurrent sinus infections every 3-4 months for the past two years. No fever.

## 2022-10-06 ENCOUNTER — HOSPITAL ENCOUNTER (OUTPATIENT)
Dept: RADIOLOGY | Facility: MEDICAL CENTER | Age: 42
End: 2022-10-06
Attending: PHYSICIAN ASSISTANT
Payer: COMMERCIAL

## 2022-10-06 DIAGNOSIS — J01.41 ACUTE RECURRENT PANSINUSITIS: ICD-10-CM

## 2022-10-06 PROCEDURE — 70220 X-RAY EXAM OF SINUSES: CPT

## 2022-10-19 ENCOUNTER — HOSPITAL ENCOUNTER (OUTPATIENT)
Dept: LAB | Facility: MEDICAL CENTER | Age: 42
End: 2022-10-19
Attending: PHYSICIAN ASSISTANT
Payer: COMMERCIAL

## 2022-10-19 DIAGNOSIS — Z13.21 ENCOUNTER FOR VITAMIN DEFICIENCY SCREENING: ICD-10-CM

## 2022-10-19 DIAGNOSIS — Z13.1 DIABETES MELLITUS SCREENING: ICD-10-CM

## 2022-10-19 DIAGNOSIS — Z13.29 SCREENING FOR THYROID DISORDER: ICD-10-CM

## 2022-10-19 DIAGNOSIS — Z82.49 FAMILY HISTORY OF HEART ATTACK: ICD-10-CM

## 2022-10-19 DIAGNOSIS — E78.00 HYPERCHOLESTEROLEMIA: ICD-10-CM

## 2022-10-19 DIAGNOSIS — R79.89 HIGH SERUM FERRITIN: ICD-10-CM

## 2022-10-19 DIAGNOSIS — E83.110 HEREDITARY HEMOCHROMATOSIS (HCC): ICD-10-CM

## 2022-10-19 LAB
25(OH)D3 SERPL-MCNC: 23 NG/ML (ref 30–100)
ALBUMIN SERPL BCP-MCNC: 4.9 G/DL (ref 3.2–4.9)
ALBUMIN/GLOB SERPL: 1.8 G/DL
ALP SERPL-CCNC: 69 U/L (ref 30–99)
ALT SERPL-CCNC: 37 U/L (ref 2–50)
ANION GAP SERPL CALC-SCNC: 12 MMOL/L (ref 7–16)
AST SERPL-CCNC: 50 U/L (ref 12–45)
BASOPHILS # BLD AUTO: 1.3 % (ref 0–1.8)
BASOPHILS # BLD: 0.05 K/UL (ref 0–0.12)
BILIRUB SERPL-MCNC: 0.4 MG/DL (ref 0.1–1.5)
BUN SERPL-MCNC: 14 MG/DL (ref 8–22)
CALCIUM SERPL-MCNC: 9.4 MG/DL (ref 8.5–10.5)
CHLORIDE SERPL-SCNC: 103 MMOL/L (ref 96–112)
CHOLEST SERPL-MCNC: 192 MG/DL (ref 100–199)
CO2 SERPL-SCNC: 25 MMOL/L (ref 20–33)
CREAT SERPL-MCNC: 0.95 MG/DL (ref 0.5–1.4)
EOSINOPHIL # BLD AUTO: 0.26 K/UL (ref 0–0.51)
EOSINOPHIL NFR BLD: 6.9 % (ref 0–6.9)
ERYTHROCYTE [DISTWIDTH] IN BLOOD BY AUTOMATED COUNT: 43.6 FL (ref 35.9–50)
EST. AVERAGE GLUCOSE BLD GHB EST-MCNC: 105 MG/DL
FASTING STATUS PATIENT QL REPORTED: NORMAL
FERRITIN SERPL-MCNC: 436 NG/ML (ref 22–322)
GFR SERPLBLD CREATININE-BSD FMLA CKD-EPI: 102 ML/MIN/1.73 M 2
GGT SERPL-CCNC: 19 U/L (ref 7–51)
GLOBULIN SER CALC-MCNC: 2.7 G/DL (ref 1.9–3.5)
GLUCOSE SERPL-MCNC: 81 MG/DL (ref 65–99)
HBA1C MFR BLD: 5.3 % (ref 4–5.6)
HCT VFR BLD AUTO: 48.5 % (ref 42–52)
HDLC SERPL-MCNC: 62 MG/DL
HGB BLD-MCNC: 16.6 G/DL (ref 14–18)
IMM GRANULOCYTES # BLD AUTO: 0.01 K/UL (ref 0–0.11)
IMM GRANULOCYTES NFR BLD AUTO: 0.3 % (ref 0–0.9)
IRON SATN MFR SERPL: 30 % (ref 15–55)
IRON SERPL-MCNC: 81 UG/DL (ref 50–180)
LDLC SERPL CALC-MCNC: 106 MG/DL
LYMPHOCYTES # BLD AUTO: 1.64 K/UL (ref 1–4.8)
LYMPHOCYTES NFR BLD: 43.4 % (ref 22–41)
MCH RBC QN AUTO: 31.5 PG (ref 27–33)
MCHC RBC AUTO-ENTMCNC: 34.2 G/DL (ref 33.7–35.3)
MCV RBC AUTO: 92 FL (ref 81.4–97.8)
MONOCYTES # BLD AUTO: 0.42 K/UL (ref 0–0.85)
MONOCYTES NFR BLD AUTO: 11.1 % (ref 0–13.4)
NEUTROPHILS # BLD AUTO: 1.4 K/UL (ref 1.82–7.42)
NEUTROPHILS NFR BLD: 37 % (ref 44–72)
NRBC # BLD AUTO: 0 K/UL
NRBC BLD-RTO: 0 /100 WBC
PLATELET # BLD AUTO: 272 K/UL (ref 164–446)
PMV BLD AUTO: 9.9 FL (ref 9–12.9)
POTASSIUM SERPL-SCNC: 4.5 MMOL/L (ref 3.6–5.5)
PROT SERPL-MCNC: 7.6 G/DL (ref 6–8.2)
RBC # BLD AUTO: 5.27 M/UL (ref 4.7–6.1)
SODIUM SERPL-SCNC: 140 MMOL/L (ref 135–145)
T4 FREE SERPL-MCNC: 1.3 NG/DL (ref 0.93–1.7)
TIBC SERPL-MCNC: 268 UG/DL (ref 250–450)
TRIGL SERPL-MCNC: 118 MG/DL (ref 0–149)
TSH SERPL DL<=0.005 MIU/L-ACNC: 4.29 UIU/ML (ref 0.38–5.33)
UIBC SERPL-MCNC: 187 UG/DL (ref 110–370)
VIT B12 SERPL-MCNC: 576 PG/ML (ref 211–911)
WBC # BLD AUTO: 3.8 K/UL (ref 4.8–10.8)

## 2022-10-19 PROCEDURE — 80053 COMPREHEN METABOLIC PANEL: CPT

## 2022-10-19 PROCEDURE — 82977 ASSAY OF GGT: CPT

## 2022-10-19 PROCEDURE — 85025 COMPLETE CBC W/AUTO DIFF WBC: CPT

## 2022-10-19 PROCEDURE — 84439 ASSAY OF FREE THYROXINE: CPT

## 2022-10-19 PROCEDURE — 80061 LIPID PANEL: CPT

## 2022-10-19 PROCEDURE — 82728 ASSAY OF FERRITIN: CPT

## 2022-10-19 PROCEDURE — 83550 IRON BINDING TEST: CPT

## 2022-10-19 PROCEDURE — 82607 VITAMIN B-12: CPT

## 2022-10-19 PROCEDURE — 83540 ASSAY OF IRON: CPT

## 2022-10-19 PROCEDURE — 36415 COLL VENOUS BLD VENIPUNCTURE: CPT

## 2022-10-19 PROCEDURE — 84443 ASSAY THYROID STIM HORMONE: CPT

## 2022-10-19 PROCEDURE — 83036 HEMOGLOBIN GLYCOSYLATED A1C: CPT

## 2022-10-19 PROCEDURE — 82172 ASSAY OF APOLIPOPROTEIN: CPT

## 2022-10-19 PROCEDURE — 82306 VITAMIN D 25 HYDROXY: CPT

## 2022-10-21 DIAGNOSIS — D70.9 NEUTROPENIA, UNSPECIFIED TYPE (HCC): ICD-10-CM

## 2022-10-21 DIAGNOSIS — R74.01 ELEVATED AST (SGOT): ICD-10-CM

## 2022-10-21 LAB — APO B100 SERPL-MCNC: 90 MG/DL (ref 55–140)

## 2022-12-03 DIAGNOSIS — J45.20 MILD INTERMITTENT ASTHMA WITHOUT COMPLICATION: ICD-10-CM

## 2022-12-05 RX ORDER — ALBUTEROL SULFATE 90 UG/1
AEROSOL, METERED RESPIRATORY (INHALATION)
Qty: 9 G | Refills: 0 | Status: SHIPPED | OUTPATIENT
Start: 2022-12-05 | End: 2023-01-09

## 2023-01-09 DIAGNOSIS — J45.20 MILD INTERMITTENT ASTHMA WITHOUT COMPLICATION: ICD-10-CM

## 2023-01-09 RX ORDER — ALBUTEROL SULFATE 90 UG/1
AEROSOL, METERED RESPIRATORY (INHALATION)
Qty: 9 G | Refills: 0 | Status: SHIPPED | OUTPATIENT
Start: 2023-01-09 | End: 2023-04-12 | Stop reason: SDUPTHER

## 2023-04-12 DIAGNOSIS — J45.20 MILD INTERMITTENT ASTHMA WITHOUT COMPLICATION: ICD-10-CM

## 2023-04-13 RX ORDER — ALBUTEROL SULFATE 90 UG/1
2 AEROSOL, METERED RESPIRATORY (INHALATION) EVERY 6 HOURS PRN
Qty: 9 G | Refills: 0 | Status: SHIPPED | OUTPATIENT
Start: 2023-04-13 | End: 2023-05-30 | Stop reason: SDUPTHER

## 2023-06-22 DIAGNOSIS — J45.20 MILD INTERMITTENT ASTHMA WITHOUT COMPLICATION: ICD-10-CM

## 2023-06-22 RX ORDER — ALBUTEROL SULFATE 90 UG/1
2 AEROSOL, METERED RESPIRATORY (INHALATION) EVERY 6 HOURS PRN
Qty: 9 G | Refills: 0 | Status: SHIPPED | OUTPATIENT
Start: 2023-06-22 | End: 2023-08-10 | Stop reason: SDUPTHER

## 2023-06-29 ENCOUNTER — TELEMEDICINE (OUTPATIENT)
Dept: MEDICAL GROUP | Facility: IMAGING CENTER | Age: 43
End: 2023-06-29
Payer: COMMERCIAL

## 2023-06-29 VITALS — BODY MASS INDEX: 25.01 KG/M2 | HEIGHT: 68 IN | WEIGHT: 165 LBS

## 2023-06-29 DIAGNOSIS — E83.110 HEREDITARY HEMOCHROMATOSIS (HCC): ICD-10-CM

## 2023-06-29 DIAGNOSIS — D70.8 OTHER NEUTROPENIA (HCC): ICD-10-CM

## 2023-06-29 DIAGNOSIS — Z13.0 SCREENING FOR DEFICIENCY ANEMIA: ICD-10-CM

## 2023-06-29 DIAGNOSIS — Z13.29 SCREENING FOR THYROID DISORDER: ICD-10-CM

## 2023-06-29 DIAGNOSIS — Z13.1 DIABETES MELLITUS SCREENING: ICD-10-CM

## 2023-06-29 DIAGNOSIS — Z01.84 IMMUNITY STATUS TESTING: ICD-10-CM

## 2023-06-29 DIAGNOSIS — Z13.220 SCREENING CHOLESTEROL LEVEL: ICD-10-CM

## 2023-06-29 DIAGNOSIS — J45.909 ACUTE ASTHMATIC BRONCHITIS: ICD-10-CM

## 2023-06-29 DIAGNOSIS — E55.9 VITAMIN D DEFICIENCY: ICD-10-CM

## 2023-06-29 PROBLEM — R05.1 ACUTE COUGH: Status: ACTIVE | Noted: 2023-06-29

## 2023-06-29 PROCEDURE — 99214 OFFICE O/P EST MOD 30 MIN: CPT | Mod: 95 | Performed by: PHYSICIAN ASSISTANT

## 2023-06-29 RX ORDER — BUDESONIDE AND FORMOTEROL FUMARATE DIHYDRATE 80; 4.5 UG/1; UG/1
2 AEROSOL RESPIRATORY (INHALATION) 2 TIMES DAILY
Qty: 10.2 G | Refills: 0 | Status: SHIPPED | OUTPATIENT
Start: 2023-06-29

## 2023-06-29 RX ORDER — METHYLPREDNISOLONE 4 MG/1
TABLET ORAL
Qty: 21 TABLET | Refills: 0 | Status: SHIPPED | OUTPATIENT
Start: 2023-06-29 | End: 2023-08-31 | Stop reason: SDUPTHER

## 2023-06-29 RX ORDER — AZITHROMYCIN 250 MG/1
TABLET, FILM COATED ORAL
Qty: 6 TABLET | Refills: 0 | Status: SHIPPED | OUTPATIENT
Start: 2023-06-29 | End: 2023-08-31 | Stop reason: SDUPTHER

## 2023-06-29 RX ORDER — BUDESONIDE 1 MG/2ML
INHALANT ORAL
COMMUNITY
Start: 2023-05-11

## 2023-06-29 ASSESSMENT — PATIENT HEALTH QUESTIONNAIRE - PHQ9: CLINICAL INTERPRETATION OF PHQ2 SCORE: 0

## 2023-06-29 ASSESSMENT — PAIN SCALES - GENERAL: PAINLEVEL: NO PAIN

## 2023-06-29 ASSESSMENT — FIBROSIS 4 INDEX: FIB4 SCORE: 1.3

## 2023-06-29 NOTE — ASSESSMENT & PLAN NOTE
Patient has dry cough for 2 weeks with throat irritation.  He has been taking Mucinex, honey, antihistamine daily.  He is using his albuterol multiple times throughout the day.  Also on Singulair for allergies and asthma.  Admits to occasional wheeze and coughing fits.  No fever, chills, body aches, ear pain, sinus congestion.  Recent sinus surgery.

## 2023-06-29 NOTE — PROGRESS NOTES
Virtual Visit: Established Patient   This visit was conducted via Zoom using secure and encrypted videoconferencing technology. The patient was in a private location in the state of [place].    The patient's identity was confirmed and verbal consent was obtained for this virtual visit.    Subjective:   CC:   Chief Complaint   Patient presents with    Cough     Dry cough      Pharyngitis     [id number]        Mariano Mata is a 42 y.o. male presenting for evaluation and management of:    Acute cough  Patient has dry cough for 2 weeks with throat irritation.  He has been taking Mucinex, honey, antihistamine daily.  He is using his albuterol multiple times throughout the day.  Also on Singulair for allergies and asthma.  Admits to occasional wheeze and coughing fits.  No fever, chills, body aches, ear pain, sinus congestion.  Recent sinus surgery.    Hereditary hemochromatosis (HCC)  History of genetic testing for hemochromatosis due to elevated ferritin.  He was heterozygous for the H63D mutation.    Other neutropenia (HCC)  History of low neutrophils and white blood cell count.  History of chronic sinusitis, status post recent sinus surgery.  No fever or chills.    Depression Screening    Little interest or pleasure in doing things?  0 - not at all   Feeling down, depressed , or hopeless? 0 - not at all     ROS   Denies any recent fevers or chills. No nausea or vomiting. No chest pains or shortness of breath.     No Known Allergies    Current medicines (including changes today)  Current Outpatient Medications   Medication Sig Dispense Refill    azithromycin (ZITHROMAX) 250 MG Tab Z-Pack: Take 2 tablets on day 1, then 1 tablet on days 2 through 5. 6 Tablet 0    budesonide-formoterol (SYMBICORT) 80-4.5 MCG/ACT Aerosol Inhale 2 Puffs 2 times a day. 10.2 g 0    methylPREDNISolone (MEDROL DOSEPAK) 4 MG Tablet Therapy Pack As directed on the packaging label. 21 Tablet 0    albuterol 108 (90 Base) MCG/ACT Aero Soln  "inhalation aerosol Inhale 2 Puffs every 6 hours as needed for Shortness of Breath. 9 g 0    montelukast (SINGULAIR) 10 MG Tab Take 1 Tablet by mouth every day. 90 Tablet 1    Probiotic Product (PROBIOTIC & ACIDOPHILUS EX ST PO) Take 1 Tab by mouth every day.      Multiple Vitamins-Minerals (ONE-A-DAY MENS HEALTH FORMULA PO) Take 1 Tab by mouth every day.      cetirizine (ZYRTEC) 10 MG Tab Take 10 mg by mouth every day.      Budesonide, Inhalation, 1 MG/2ML Suspension USE 1 VIAL VIA NEBULIZER EVERY DAY... NEED INS INFO      simvastatin (ZOCOR) 20 MG Tab Take 1 Tablet by mouth every evening. 90 Tablet 1     No current facility-administered medications for this visit.       Patient Active Problem List    Diagnosis Date Noted    Acute cough 2023    Other neutropenia (HCC) 2023    Vitamin D deficiency 2023    Acute recurrent pansinusitis 2022    Closed nondisplaced fracture of head of right radius with routine healing 2022    Family history of heart attack 2022    Hereditary hemochromatosis (HCC) 2022    High serum ferritin 2021    Hypercholesterolemia 2021    Family history of disorder of metabolism 2021    Fatigue 2020    Other disturbances of skin sensation 2020    Genital herpes 2016    Mild intermittent asthma without complication 2016       Family History   Problem Relation Age of Onset    Heart Disease Father         Pt father  of MI in 50s    Hyperlipidemia Father     No Known Problems Mother     Cancer Sister 38        brain cancer - GBM       He  has a past medical history of Asthma and Seasonal allergies.  He  has no past surgical history on file.         Objective:   Ht 1.727 m (5' 8\")   Wt 74.8 kg (165 lb)   BMI 25.09 kg/m²   RR 12    Physical Exam:  Constitutional: Alert, no distress, well-groomed.  Skin: No rashes in visible areas.  Eye: Round. Conjunctiva clear, lids normal. No icterus.   ENMT: Lips pink without " lesions, good dentition, moist mucous membranes. Phonation normal.  Neck: No masses, no thyromegaly. Moves freely without pain.  Respiratory: Unlabored respiratory effort, no cough or audible wheeze  Psych: Alert and oriented x3, normal affect and mood.     Assessment and Plan:   The following treatment plan was discussed:     1. Acute asthmatic bronchitis  - Use a humidifier, especially at night. Cold or warm water humidifiers have the same effect.  - Rowdy Med squeeze bottle sinus rinses or plain nasal saline twice a day  - Hot tea + honey + fresh lemon juice  - Honey by itself has been shown to help provide cough relief  - Frequent hand washing, rest, hydration  - OTC meds as recommended today during your visit: mucinex, antihistamine  - Side effects of medications reviewed  - Seek medical treatment if symptoms persist or worsen  - azithromycin (ZITHROMAX) 250 MG Tab; Z-Pack: Take 2 tablets on day 1, then 1 tablet on days 2 through 5.  Dispense: 6 Tablet; Refill: 0  Potential side effects of antibiotics include nausea, indigestion, diarrhea, rash.  If you develop any of these symptoms, please schedule an appointment in the office or go to urgent/emergency room if severe.  - budesonide-formoterol (SYMBICORT) 80-4.5 MCG/ACT Aerosol; Inhale 2 Puffs 2 times a day.  Dispense: 10.2 g; Refill: 0 - rinse mouth out after use  - methylPREDNISolone (MEDROL DOSEPAK) 4 MG Tablet Therapy Pack; As directed on the packaging label.  Dispense: 21 Tablet; Refill: 0  Potential side effects of steroids include jitteriness, increased hunger, anxiety, restlessness, difficulty sleeping, indigestion/acid reflux.  If you develop new symptoms, please follow-up in office to discuss.  If you develop severe symptoms and are unable to schedule an appointment, please go to urgent care or emergency department.    2. Hereditary hemochromatosis (HCC)  Chronic, controlled and stable. Continue current regimen - monitoring, avoid iron supplements  -  CBC WITH DIFFERENTIAL; Future  - IRON/TOTAL IRON BIND; Future  - FERRITIN; Future    3. Other neutropenia (HCC)  Chronic, controlled and stable. Continue current regimen - recheck  - CBC WITH DIFFERENTIAL; Future    4. Immunity status testing  - HEP B SURFACE AB; Future  - HEP B SURFACE ANTIGEN; Future    5. Screening for thyroid disorder  - TSH; Future  - FREE THYROXINE; Future    6. Screening cholesterol level  - Lipid Profile; Future    7. Diabetes mellitus screening  - Comp Metabolic Panel; Future    8. Screening for deficiency anemia  - CBC WITH DIFFERENTIAL; Future  - IRON/TOTAL IRON BIND; Future  - FERRITIN; Future    9. Vitamin D deficiency  - VITAMIN D,25 HYDROXY (DEFICIENCY); Future        Follow-up: Return in about 4 months (around 10/29/2023) for Annual physical.         Rachael Pantoja PA-C ([place])  Physician Assistant Certified  [facility]    Please note that this dictation was created using voice recognition software. I have made every reasonable attempt to correct obvious errors, but I expect that there are errors of grammar and possibly content that I did not discover before finalizing the note.

## 2023-06-29 NOTE — PATIENT INSTRUCTIONS
- Use a humidifier, especially at night. Cold or warm water humidifiers have the same effect.  - Rowdy Med squeeze bottle sinus rinses or plain nasal saline twice a day  - Hot tea + honey + fresh lemon juice  - Honey by itself has been shown to help provide cough relief  - Frequent hand washing, rest, hydration  - OTC meds as recommended today during your visit: mucinex  - Side effects of medications reviewed  - Seek medical treatment if symptoms persist or worsen     Potential side effects of antibiotics include nausea, indigestion, diarrhea, rash.  If you develop any of these symptoms, please schedule an appointment in the office or go to urgent/emergency room if severe.    Potential side effects of steroids include jitteriness, increased hunger, anxiety, restlessness, difficulty sleeping, indigestion/acid reflux.  If you develop new symptoms, please follow-up in office to discuss.  If you develop severe symptoms and are unable to schedule an appointment, please go to urgent care or emergency department.

## 2023-06-29 NOTE — ASSESSMENT & PLAN NOTE
History of low neutrophils and white blood cell count.  History of chronic sinusitis, status post recent sinus surgery.  No fever or chills.

## 2023-06-29 NOTE — ASSESSMENT & PLAN NOTE
History of genetic testing for hemochromatosis due to elevated ferritin.  He was heterozygous for the H63D mutation.

## 2023-08-31 ENCOUNTER — TELEMEDICINE (OUTPATIENT)
Dept: MEDICAL GROUP | Facility: IMAGING CENTER | Age: 43
End: 2023-08-31
Payer: COMMERCIAL

## 2023-08-31 VITALS — WEIGHT: 165 LBS | HEIGHT: 68 IN | BODY MASS INDEX: 25.01 KG/M2

## 2023-08-31 DIAGNOSIS — Z13.29 SCREENING FOR THYROID DISORDER: ICD-10-CM

## 2023-08-31 DIAGNOSIS — E78.00 HYPERCHOLESTEROLEMIA: ICD-10-CM

## 2023-08-31 DIAGNOSIS — R79.89 HIGH SERUM FERRITIN: ICD-10-CM

## 2023-08-31 DIAGNOSIS — Z13.0 SCREENING FOR DEFICIENCY ANEMIA: ICD-10-CM

## 2023-08-31 DIAGNOSIS — J01.41 ACUTE RECURRENT PANSINUSITIS: ICD-10-CM

## 2023-08-31 DIAGNOSIS — E55.9 VITAMIN D DEFICIENCY: ICD-10-CM

## 2023-08-31 DIAGNOSIS — Z13.1 DIABETES MELLITUS SCREENING: ICD-10-CM

## 2023-08-31 DIAGNOSIS — J45.20 MILD INTERMITTENT ASTHMA WITHOUT COMPLICATION: ICD-10-CM

## 2023-08-31 PROBLEM — R05.1 ACUTE COUGH: Status: RESOLVED | Noted: 2023-06-29 | Resolved: 2023-08-31

## 2023-08-31 PROCEDURE — 99214 OFFICE O/P EST MOD 30 MIN: CPT | Mod: 95 | Performed by: PHYSICIAN ASSISTANT

## 2023-08-31 RX ORDER — ALBUTEROL SULFATE 90 UG/1
2 AEROSOL, METERED RESPIRATORY (INHALATION) EVERY 6 HOURS PRN
Qty: 9 G | Refills: 0 | Status: CANCELLED
Start: 2023-08-31

## 2023-08-31 RX ORDER — METHYLPREDNISOLONE 4 MG/1
TABLET ORAL
Qty: 21 TABLET | Refills: 0 | Status: SHIPPED | OUTPATIENT
Start: 2023-08-31

## 2023-08-31 RX ORDER — MONTELUKAST SODIUM 10 MG/1
10 TABLET ORAL DAILY
Qty: 90 TABLET | Refills: 1 | Status: SHIPPED | OUTPATIENT
Start: 2023-08-31

## 2023-08-31 RX ORDER — ALBUTEROL SULFATE 90 UG/1
2 AEROSOL, METERED RESPIRATORY (INHALATION) EVERY 4 HOURS PRN
Qty: 1 EACH | Refills: 0 | Status: SHIPPED | OUTPATIENT
Start: 2023-08-31 | End: 2023-12-01 | Stop reason: SDUPTHER

## 2023-08-31 RX ORDER — AZITHROMYCIN 250 MG/1
TABLET, FILM COATED ORAL
Qty: 6 TABLET | Refills: 0 | Status: SHIPPED | OUTPATIENT
Start: 2023-08-31

## 2023-08-31 ASSESSMENT — FIBROSIS 4 INDEX: FIB4 SCORE: 1.3

## 2023-08-31 ASSESSMENT — PAIN SCALES - GENERAL: PAINLEVEL: NO PAIN

## 2023-08-31 NOTE — PATIENT INSTRUCTIONS
It was a pleasure meeting with you today at [facility]!    Your medical history/records and medications were reviewed today.     UPDATE on MyChart Results: If you have blood work, and/or imaging studies, or any other test or procedure completed, you will have access to results as soon as they become available in MyChart. Recently, these results will be available for review at the same time that your provider is able to see results!    This will likely mean you will see a result before your provider has had a chance to review and discuss with you.  Some results or care notes may be hard to understand and may be serious in nature.    We look at every result and your provider will contact you to explain what they mean and discuss appropriate next steps. Please allow for at least 72 business hours for chart and result review.     We prefer that you wait for your care team to contact you with your results.  Often, your provider will discuss your results with you at your next appointment. We look forward to continuing to partner with you in your care.    Please review my practice information below:    If you have any prescription refill requests, please send them via [employer] or discuss with your provider at the start of your office visits. Please allow 3-5 business days for lab and testing review and you will be contacted via [employer] with those results, or if advised to make a follow up appointment regarding those results, then please do so.     Once resulted, your lab/test/imaging results will show up automatically in your MyChart. Please wait for my interpretation and recommendations prior to viewing your results to avoid any unnecessary confusion or misinterpretation. I will address all of the lab values that I interpret as abnormal and message you accordingly on your MyChart. I will always send you a message about your results even if they are normal. If you do not hear back from me within 5-7 business  days after completing your tests, then please send me a message on [facility] so I can obtain your results (especially if you went to an outside lab or imaging center - [facility], [facility], etc).     If you have any additional questions or concerns beyond my interpretation of your results, please make an appointment with me to discuss in further detail.    Please only use the [facility] messaging system for questions regarding your most recent appointment or if advised to use otherwise (glucose or blood pressure reporting).     If you have any new problems or concerns, you must make an appointment to discuss. This includes any referral requests, lab requests (unless advised to notify me for pre-appt labs), medication side effects, or request for medication adjustments.     Please arrive 15 minutes prior to your appointment time to complete your check-in and intake with the medical assistant.      Thank you,    Rachael Pantoja PA-C ([place])  Physician Assistant Certified  [facility]    -----------------------------------------------------------------    Attn: Patients of [facility]:    In an effort to continue to provide excellent and efficient care to our patients, it is vital that we continue to use our resources appropriately. With that, this is a reminder that [facility] is used for prescription refill requests, test results, virtual visits, and chart review only.     Any new questions, concerns/conditions, lab/imaging requests, medication adjustments, new prescriptions, or referral requests do require an appointment (virtually or in person), unless discussed otherwise at your most recent appointment.     Thank you for your understanding,    [facility]

## 2023-08-31 NOTE — PROGRESS NOTES
Virtual Visit: Established Patient   This visit was conducted via Zoom using secure and encrypted videoconferencing technology. The patient was in a private location in the state of [place].    The patient's identity was confirmed and verbal consent was obtained for this virtual visit.    Subjective:   CC:   Chief Complaint   Patient presents with    Sinus Problem     [id number] with pressure, phlegm.     Medication Management     Refills        Mariano Mata is a 42 y.o. male presenting for evaluation and management of:    Acute recurrent pansinusitis  Pt admits to sinus pressure and drainage x 3 weeks. Was recently traveling. Has a history of sinus surgery 5/2023.    Hypercholesterolemia  Chronic, compliant with Simvastatin. Due for labs in October. Would like pre-appt labs.    Mild intermittent asthma without complication  Chronic, uncontrolled. Has not been taking Singulair - he does not know why he isn't on it. Does not consistently use Symbicort. Uses albuterol multiple times per day.    Depression Screening    Little interest or pleasure in doing things?      Feeling down, depressed , or hopeless?     Trouble falling or staying asleep, or sleeping too much?      Feeling tired or having little energy?      Poor appetite or overeating?      Feeling bad about yourself - or that you are a failure or have let yourself or your family down?     Trouble concentrating on things, such as reading the newspaper or watching television?     Moving or speaking so slowly that other people could have noticed.  Or the opposite - being so fidgety or restless that you have been moving around a lot more than usual?      Thoughts that you would be better off dead, or of hurting yourself?      Patient Health Questionnaire Score:         If depressive symptoms identified deferred to follow up visit unless specifically addressed in assesment and plan.    Interpretation of PHQ-9 Total Score   Score Severity   1-4 No Depression    5-9 Mild Depression   10-14 Moderate Depression   15-19 Moderately Severe Depression   20-27 Severe Depression          ROS   Denies any recent fevers or chills. No nausea or vomiting. No chest pains or shortness of breath.     No Known Allergies    Current medicines (including changes today)  Current Outpatient Medications   Medication Sig Dispense Refill    montelukast (SINGULAIR) 10 MG Tab Take 1 Tablet by mouth every day. 90 Tablet 1    albuterol 108 (90 Base) MCG/ACT Aero Soln inhalation aerosol Inhale 2 Puffs every four hours as needed for Shortness of Breath. 1 Each 0    methylPREDNISolone (MEDROL DOSEPAK) 4 MG Tablet Therapy Pack As directed on the packaging label. 21 Tablet 0    azithromycin (ZITHROMAX) 250 MG Tab Z-Pack: Take 2 tablets on day 1, then 1 tablet on days 2 through 5. 6 Tablet 0    Budesonide, Inhalation, 1 MG/2ML Suspension USE 1 VIAL VIA NEBULIZER EVERY DAY... NEED INS INFO      budesonide-formoterol (SYMBICORT) 80-4.5 MCG/ACT Aerosol Inhale 2 Puffs 2 times a day. 10.2 g 0    simvastatin (ZOCOR) 20 MG Tab Take 1 Tablet by mouth every evening. 90 Tablet 1    Probiotic Product (PROBIOTIC & ACIDOPHILUS EX ST PO) Take 1 Tab by mouth every day.      Multiple Vitamins-Minerals (ONE-A-DAY MENS HEALTH FORMULA PO) Take 1 Tab by mouth every day.      cetirizine (ZYRTEC) 10 MG Tab Take 10 mg by mouth every day.       No current facility-administered medications for this visit.       Patient Active Problem List    Diagnosis Date Noted    Other neutropenia (HCC) 06/29/2023    Vitamin D deficiency 06/29/2023    Acute recurrent pansinusitis 09/26/2022    Closed nondisplaced fracture of head of right radius with routine healing 03/28/2022    Family history of heart attack 03/28/2022    Hereditary hemochromatosis (HCC) 03/28/2022    High serum ferritin 03/16/2021    Hypercholesterolemia 03/16/2021    Family history of disorder of metabolism 01/06/2021    Fatigue 12/01/2020    Other disturbances of skin  "sensation 2020    Genital herpes 2016    Mild intermittent asthma without complication 2016       Family History   Problem Relation Age of Onset    Heart Disease Father         Pt father  of MI in 50s    Hyperlipidemia Father     No Known Problems Mother     Cancer Sister 38        brain cancer - GBM       He  has a past medical history of Asthma and Seasonal allergies.  He  has no past surgical history on file.         Objective:   Ht 1.727 m (5' 8\")   Wt 74.8 kg (165 lb)   BMI 25.09 kg/m²   RR 12    Physical Exam:  Constitutional: Alert, no distress, well-groomed.  Skin: No rashes in visible areas.  Eye: Round. Conjunctiva clear, lids normal. No icterus.   ENMT: Lips pink without lesions, good dentition, moist mucous membranes. Phonation normal.  Neck: No masses, no thyromegaly. Moves freely without pain.  Respiratory: Unlabored respiratory effort, no cough or audible wheeze  Psych: Alert and oriented x3, normal affect and mood.     Assessment and Plan:   The following treatment plan was discussed:     1. Mild intermittent asthma without complication  Chronic, uncontrolled. Restart singulair.   - montelukast (SINGULAIR) 10 MG Tab; Take 1 Tablet by mouth every day.  Dispense: 90 Tablet; Refill: 1  - albuterol 108 (90 Base) MCG/ACT Aero Soln inhalation aerosol; Inhale 2 Puffs every four hours as needed for Shortness of Breath.  Dispense: 1 Each; Refill: 0    2. Hypercholesterolemia  Chronic, controlled and stable. Continue current regimen - Simvastatin 20 mg daily.   - Lipid Profile; Future    3. High serum ferritin  - CBC WITH DIFFERENTIAL; Future  - FERRITIN; Future    4. Acute recurrent pansinusitis  - Use a humidifier, especially at night. Cold or warm water humidifiers have the same effect.  - Rowdy Med squeeze bottle sinus rinses or plain nasal saline twice a day  - Frequent hand washing, rest, hydration  - Side effects of medications reviewed  - Seek medical treatment if symptoms " persist or worsen  - methylPREDNISolone (MEDROL DOSEPAK) 4 MG Tablet Therapy Pack; As directed on the packaging label.  Dispense: 21 Tablet; Refill: 0  - azithromycin (ZITHROMAX) 250 MG Tab; Z-Pack: Take 2 tablets on day 1, then 1 tablet on days 2 through 5.  Dispense: 6 Tablet; Refill: 0    5. Vitamin D deficiency  - VITAMIN D,25 HYDROXY (DEFICIENCY); Future    6. Screening for thyroid disorder  - TSH; Future  - FREE THYROXINE; Future  - T3 FREE; Future    7. Screening for deficiency anemia  - CBC WITH DIFFERENTIAL; Future    8. Diabetes mellitus screening  - Comp Metabolic Panel; Future      Follow-up: Return in about 8 weeks (around 10/26/2023) for Annual physical.         Rachael Pantoja PA-C ([place])  Physician Assistant Certified  [facility]    Please note that this dictation was created using voice recognition software. I have made every reasonable attempt to correct obvious errors, but I expect that there are errors of grammar and possibly content that I did not discover before finalizing the note.

## 2023-08-31 NOTE — ASSESSMENT & PLAN NOTE
Pt admits to sinus pressure and drainage x 3 weeks. Was recently traveling. Has a history of sinus surgery 5/2023.

## 2023-08-31 NOTE — ASSESSMENT & PLAN NOTE
Chronic, uncontrolled. Has not been taking Singulair - he does not know why he isn't on it. Does not consistently use Symbicort. Uses albuterol multiple times per day.

## 2023-12-01 DIAGNOSIS — J45.20 MILD INTERMITTENT ASTHMA WITHOUT COMPLICATION: ICD-10-CM

## 2023-12-01 RX ORDER — ALBUTEROL SULFATE 90 UG/1
2 AEROSOL, METERED RESPIRATORY (INHALATION) EVERY 4 HOURS PRN
Qty: 1 EACH | Refills: 0 | Status: SHIPPED | OUTPATIENT
Start: 2023-12-01

## 2024-02-20 RX ORDER — SIMVASTATIN 20 MG
20 TABLET ORAL NIGHTLY
Qty: 90 TABLET | Refills: 1 | Status: SHIPPED | OUTPATIENT
Start: 2024-02-20

## 2024-02-20 NOTE — TELEPHONE ENCOUNTER
Received request via: Pharmacy    Was the patient seen in the last year in this department? Yes    Does the patient have an active prescription (recently filled or refills available) for medication(s) requested? No    Pharmacy Name: [facility]     Does the patient have longterm Plus and need 100 day supply (blood pressure, diabetes and cholesterol meds only)? Patient does not have SCP

## 2024-05-16 DIAGNOSIS — J45.20 MILD INTERMITTENT ASTHMA WITHOUT COMPLICATION: ICD-10-CM

## 2024-05-16 RX ORDER — ALBUTEROL SULFATE 90 UG/1
2 AEROSOL, METERED RESPIRATORY (INHALATION) EVERY 4 HOURS PRN
Qty: 1 EACH | Refills: 0 | Status: SHIPPED | OUTPATIENT
Start: 2024-05-16

## 2024-05-16 NOTE — TELEPHONE ENCOUNTER
Received request via: Patient    Was the patient seen in the last year in this department? Yes    Does the patient have an active prescription (recently filled or refills available) for medication(s) requested? No    Pharmacy Name: [facility] [id number]    Does the patient have senior living Plus and need 100 day supply (blood pressure, diabetes and cholesterol meds only)? Patient does not have SCP

## 2024-07-24 ENCOUNTER — TELEMEDICINE (OUTPATIENT)
Dept: TELEHEALTH | Facility: TELEMEDICINE | Age: 44
End: 2024-07-24
Payer: COMMERCIAL

## 2024-07-24 DIAGNOSIS — Z86.39 H/O HYPERCHOLESTEROLEMIA: ICD-10-CM

## 2024-07-24 DIAGNOSIS — U07.1 COVID: ICD-10-CM

## 2024-07-24 PROCEDURE — 99213 OFFICE O/P EST LOW 20 MIN: CPT | Mod: 95 | Performed by: NURSE PRACTITIONER

## 2024-07-24 ASSESSMENT — ENCOUNTER SYMPTOMS
NEUROLOGICAL NEGATIVE: 1
CHILLS: 0
FEVER: 0
GASTROINTESTINAL NEGATIVE: 1
MYALGIAS: 1
CARDIOVASCULAR NEGATIVE: 1
RESPIRATORY NEGATIVE: 1

## 2024-07-30 ENCOUNTER — TELEMEDICINE (OUTPATIENT)
Dept: MEDICAL GROUP | Facility: IMAGING CENTER | Age: 44
End: 2024-07-30
Payer: COMMERCIAL

## 2024-07-30 VITALS — HEIGHT: 68 IN | TEMPERATURE: 98.5 F | BODY MASS INDEX: 25.01 KG/M2 | WEIGHT: 165 LBS

## 2024-07-30 DIAGNOSIS — U07.1 COVID-19: ICD-10-CM

## 2024-07-30 DIAGNOSIS — J01.41 ACUTE RECURRENT PANSINUSITIS: ICD-10-CM

## 2024-07-30 RX ORDER — AZITHROMYCIN 250 MG/1
TABLET, FILM COATED ORAL
Qty: 6 TABLET | Refills: 0 | Status: SHIPPED | OUTPATIENT
Start: 2024-07-30

## 2024-07-30 ASSESSMENT — FIBROSIS 4 INDEX: FIB4 SCORE: 1.33

## 2024-07-30 ASSESSMENT — PAIN SCALES - GENERAL: PAINLEVEL: NO PAIN

## 2024-09-09 DIAGNOSIS — J45.20 MILD INTERMITTENT ASTHMA WITHOUT COMPLICATION: ICD-10-CM

## 2024-09-10 NOTE — TELEPHONE ENCOUNTER
Received request via: Pharmacy    Was the patient seen in the last year in this department? Yes    Does the patient have an active prescription (recently filled or refills available) for medication(s) requested? No    Pharmacy Name: [id number]    Does the patient have [facility] Plus and need 100-day supply? (This applies to ALL medications) Patient does not have SCP

## 2024-09-11 RX ORDER — ALBUTEROL SULFATE 90 UG/1
2 INHALANT RESPIRATORY (INHALATION) EVERY 4 HOURS PRN
Qty: 1 EACH | Refills: 0 | Status: SHIPPED | OUTPATIENT
Start: 2024-09-11 | End: 2024-09-25 | Stop reason: SDUPTHER

## 2024-09-12 ENCOUNTER — TELEPHONE (OUTPATIENT)
Dept: HEALTH INFORMATION MANAGEMENT | Facility: OTHER | Age: 44
End: 2024-09-12
Payer: COMMERCIAL

## 2024-09-25 DIAGNOSIS — J45.20 MILD INTERMITTENT ASTHMA WITHOUT COMPLICATION: ICD-10-CM

## 2024-09-25 NOTE — TELEPHONE ENCOUNTER
Received request via: Patient    Was the patient seen in the last year in this department? Yes    Does the patient have an active prescription (recently filled or refills available) for medication(s) requested? No    Pharmacy Name: [facility]    Does the patient have custodial Plus and need 100-day supply? (This applies to ALL medications) Patient does not have SCP      Patient comment: Hello, I know I just got a refill but I like to have 2 on hand so I have a backup. Please approve. Thank you.

## 2024-09-26 RX ORDER — ALBUTEROL SULFATE 90 UG/1
2 INHALANT RESPIRATORY (INHALATION) EVERY 4 HOURS PRN
Qty: 1 EACH | Refills: 0 | Status: SHIPPED | OUTPATIENT
Start: 2024-09-26

## 2024-11-10 DIAGNOSIS — J45.20 MILD INTERMITTENT ASTHMA WITHOUT COMPLICATION: ICD-10-CM

## 2024-11-11 ENCOUNTER — OFFICE VISIT (OUTPATIENT)
Dept: MEDICAL GROUP | Facility: IMAGING CENTER | Age: 44
End: 2024-11-11
Payer: COMMERCIAL

## 2024-11-11 VITALS
BODY MASS INDEX: 25.61 KG/M2 | RESPIRATION RATE: 16 BRPM | TEMPERATURE: 98.3 F | WEIGHT: 169 LBS | DIASTOLIC BLOOD PRESSURE: 80 MMHG | HEIGHT: 68 IN | HEART RATE: 84 BPM | OXYGEN SATURATION: 98 % | SYSTOLIC BLOOD PRESSURE: 118 MMHG

## 2024-11-11 DIAGNOSIS — E78.00 HYPERCHOLESTEROLEMIA: ICD-10-CM

## 2024-11-11 DIAGNOSIS — E55.9 VITAMIN D DEFICIENCY: ICD-10-CM

## 2024-11-11 DIAGNOSIS — Z13.21 ENCOUNTER FOR VITAMIN DEFICIENCY SCREENING: ICD-10-CM

## 2024-11-11 DIAGNOSIS — R53.83 FATIGUE, UNSPECIFIED TYPE: ICD-10-CM

## 2024-11-11 DIAGNOSIS — Z13.220 SCREENING CHOLESTEROL LEVEL: ICD-10-CM

## 2024-11-11 DIAGNOSIS — E83.110 HEREDITARY HEMOCHROMATOSIS (HCC): ICD-10-CM

## 2024-11-11 DIAGNOSIS — J45.20 MILD INTERMITTENT ASTHMA WITHOUT COMPLICATION: ICD-10-CM

## 2024-11-11 DIAGNOSIS — Z13.29 SCREENING FOR THYROID DISORDER: ICD-10-CM

## 2024-11-11 DIAGNOSIS — H61.23 BILATERAL IMPACTED CERUMEN: ICD-10-CM

## 2024-11-11 DIAGNOSIS — Z13.1 DIABETES MELLITUS SCREENING: ICD-10-CM

## 2024-11-11 DIAGNOSIS — Z01.84 IMMUNITY STATUS TESTING: ICD-10-CM

## 2024-11-11 DIAGNOSIS — Z23 NEED FOR VACCINATION: ICD-10-CM

## 2024-11-11 DIAGNOSIS — Z12.83 SKIN CANCER SCREENING: ICD-10-CM

## 2024-11-11 DIAGNOSIS — Z12.11 COLON CANCER SCREENING: ICD-10-CM

## 2024-11-11 DIAGNOSIS — Z13.0 SCREENING FOR DEFICIENCY ANEMIA: ICD-10-CM

## 2024-11-11 DIAGNOSIS — D70.8 OTHER NEUTROPENIA (HCC): ICD-10-CM

## 2024-11-11 DIAGNOSIS — Z00.00 WELLNESS EXAMINATION: ICD-10-CM

## 2024-11-11 PROBLEM — U07.1 COVID-19: Status: RESOLVED | Noted: 2024-07-30 | Resolved: 2024-11-11

## 2024-11-11 PROBLEM — J01.41 ACUTE RECURRENT PANSINUSITIS: Status: RESOLVED | Noted: 2022-09-26 | Resolved: 2024-11-11

## 2024-11-11 PROBLEM — R20.8 OTHER DISTURBANCES OF SKIN SENSATION: Status: RESOLVED | Noted: 2020-12-01 | Resolved: 2024-11-11

## 2024-11-11 PROBLEM — S52.124D CLOSED NONDISPLACED FRACTURE OF HEAD OF RIGHT RADIUS WITH ROUTINE HEALING: Status: RESOLVED | Noted: 2022-03-28 | Resolved: 2024-11-11

## 2024-11-11 PROCEDURE — 90656 IIV3 VACC NO PRSV 0.5 ML IM: CPT

## 2024-11-11 PROCEDURE — 99396 PREV VISIT EST AGE 40-64: CPT | Mod: 25 | Performed by: PHYSICIAN ASSISTANT

## 2024-11-11 PROCEDURE — 90471 IMMUNIZATION ADMIN: CPT

## 2024-11-11 PROCEDURE — 69210 REMOVE IMPACTED EAR WAX UNI: CPT | Mod: 50 | Performed by: PHYSICIAN ASSISTANT

## 2024-11-11 RX ORDER — ALBUTEROL SULFATE 90 UG/1
2 INHALANT RESPIRATORY (INHALATION) EVERY 4 HOURS PRN
Qty: 2 EACH | Refills: 3 | Status: SHIPPED | OUTPATIENT
Start: 2024-11-11

## 2024-11-11 RX ORDER — SIMVASTATIN 20 MG
20 TABLET ORAL NIGHTLY
Qty: 90 TABLET | Refills: 3 | Status: SHIPPED | OUTPATIENT
Start: 2024-11-11

## 2024-11-11 RX ORDER — ALBUTEROL SULFATE 90 UG/1
2 INHALANT RESPIRATORY (INHALATION) EVERY 4 HOURS PRN
Qty: 1 EACH | Refills: 0 | Status: SHIPPED | OUTPATIENT
Start: 2024-11-11 | End: 2024-11-11 | Stop reason: SDUPTHER

## 2024-11-11 RX ORDER — MONTELUKAST SODIUM 10 MG/1
10 TABLET ORAL DAILY
Qty: 90 TABLET | Refills: 3 | Status: SHIPPED | OUTPATIENT
Start: 2024-11-11

## 2024-11-11 SDOH — ECONOMIC STABILITY: INCOME INSECURITY: HOW HARD IS IT FOR YOU TO PAY FOR THE VERY BASICS LIKE FOOD, HOUSING, MEDICAL CARE, AND HEATING?: NOT HARD AT ALL

## 2024-11-11 SDOH — ECONOMIC STABILITY: HOUSING INSECURITY
IN THE LAST 12 MONTHS, WAS THERE A TIME WHEN YOU DID NOT HAVE A STEADY PLACE TO SLEEP OR SLEPT IN A SHELTER (INCLUDING NOW)?: NO

## 2024-11-11 SDOH — ECONOMIC STABILITY: FOOD INSECURITY: WITHIN THE PAST 12 MONTHS, THE FOOD YOU BOUGHT JUST DIDN'T LAST AND YOU DIDN'T HAVE MONEY TO GET MORE.: NEVER TRUE

## 2024-11-11 SDOH — ECONOMIC STABILITY: TRANSPORTATION INSECURITY
IN THE PAST 12 MONTHS, HAS THE LACK OF TRANSPORTATION KEPT YOU FROM MEDICAL APPOINTMENTS OR FROM GETTING MEDICATIONS?: NO

## 2024-11-11 SDOH — ECONOMIC STABILITY: INCOME INSECURITY: IN THE LAST 12 MONTHS, WAS THERE A TIME WHEN YOU WERE NOT ABLE TO PAY THE MORTGAGE OR RENT ON TIME?: NO

## 2024-11-11 SDOH — ECONOMIC STABILITY: FOOD INSECURITY: WITHIN THE PAST 12 MONTHS, YOU WORRIED THAT YOUR FOOD WOULD RUN OUT BEFORE YOU GOT MONEY TO BUY MORE.: NEVER TRUE

## 2024-11-11 SDOH — HEALTH STABILITY: PHYSICAL HEALTH: ON AVERAGE, HOW MANY DAYS PER WEEK DO YOU ENGAGE IN MODERATE TO STRENUOUS EXERCISE (LIKE A BRISK WALK)?: 3 DAYS

## 2024-11-11 SDOH — HEALTH STABILITY: PHYSICAL HEALTH: ON AVERAGE, HOW MANY MINUTES DO YOU ENGAGE IN EXERCISE AT THIS LEVEL?: 40 MIN

## 2024-11-11 SDOH — HEALTH STABILITY: MENTAL HEALTH
STRESS IS WHEN SOMEONE FEELS TENSE, NERVOUS, ANXIOUS, OR CAN'T SLEEP AT NIGHT BECAUSE THEIR MIND IS TROUBLED. HOW STRESSED ARE YOU?: RATHER MUCH

## 2024-11-11 SDOH — ECONOMIC STABILITY: TRANSPORTATION INSECURITY
IN THE PAST 12 MONTHS, HAS LACK OF TRANSPORTATION KEPT YOU FROM MEETINGS, WORK, OR FROM GETTING THINGS NEEDED FOR DAILY LIVING?: NO

## 2024-11-11 SDOH — ECONOMIC STABILITY: TRANSPORTATION INSECURITY
IN THE PAST 12 MONTHS, HAS LACK OF RELIABLE TRANSPORTATION KEPT YOU FROM MEDICAL APPOINTMENTS, MEETINGS, WORK OR FROM GETTING THINGS NEEDED FOR DAILY LIVING?: NO

## 2024-11-11 ASSESSMENT — SOCIAL DETERMINANTS OF HEALTH (SDOH)
IN THE PAST 12 MONTHS, HAS THE ELECTRIC, GAS, OIL, OR WATER COMPANY THREATENED TO SHUT OFF SERVICE IN YOUR HOME?: NO
WITHIN THE PAST 12 MONTHS, YOU WORRIED THAT YOUR FOOD WOULD RUN OUT BEFORE YOU GOT THE MONEY TO BUY MORE: NEVER TRUE
IN A TYPICAL WEEK, HOW MANY TIMES DO YOU TALK ON THE PHONE WITH FAMILY, FRIENDS, OR NEIGHBORS?: MORE THAN THREE TIMES A WEEK
HOW OFTEN DO YOU ATTENT MEETINGS OF THE CLUB OR ORGANIZATION YOU BELONG TO?: NEVER
HOW OFTEN DO YOU HAVE A DRINK CONTAINING ALCOHOL: 2-4 TIMES A MONTH
HOW OFTEN DO YOU GET TOGETHER WITH FRIENDS OR RELATIVES?: TWICE A WEEK
HOW OFTEN DO YOU ATTEND CHURCH OR RELIGIOUS SERVICES?: NEVER
HOW OFTEN DO YOU ATTEND CHURCH OR RELIGIOUS SERVICES?: NEVER
HOW OFTEN DO YOU HAVE SIX OR MORE DRINKS ON ONE OCCASION: LESS THAN MONTHLY
HOW OFTEN DO YOU ATTENT MEETINGS OF THE CLUB OR ORGANIZATION YOU BELONG TO?: NEVER
HOW OFTEN DO YOU GET TOGETHER WITH FRIENDS OR RELATIVES?: TWICE A WEEK
HOW MANY DRINKS CONTAINING ALCOHOL DO YOU HAVE ON A TYPICAL DAY WHEN YOU ARE DRINKING: 1 OR 2
DO YOU BELONG TO ANY CLUBS OR ORGANIZATIONS SUCH AS CHURCH GROUPS UNIONS, FRATERNAL OR ATHLETIC GROUPS, OR SCHOOL GROUPS?: NO
HOW HARD IS IT FOR YOU TO PAY FOR THE VERY BASICS LIKE FOOD, HOUSING, MEDICAL CARE, AND HEATING?: NOT HARD AT ALL
IN A TYPICAL WEEK, HOW MANY TIMES DO YOU TALK ON THE PHONE WITH FAMILY, FRIENDS, OR NEIGHBORS?: MORE THAN THREE TIMES A WEEK
DO YOU BELONG TO ANY CLUBS OR ORGANIZATIONS SUCH AS CHURCH GROUPS UNIONS, FRATERNAL OR ATHLETIC GROUPS, OR SCHOOL GROUPS?: NO

## 2024-11-11 ASSESSMENT — PAIN SCALES - GENERAL: PAINLEVEL_OUTOF10: NO PAIN

## 2024-11-11 ASSESSMENT — LIFESTYLE VARIABLES
SKIP TO QUESTIONS 9-10: 0
HOW OFTEN DO YOU HAVE SIX OR MORE DRINKS ON ONE OCCASION: LESS THAN MONTHLY
HOW MANY STANDARD DRINKS CONTAINING ALCOHOL DO YOU HAVE ON A TYPICAL DAY: 1 OR 2
HOW OFTEN DO YOU HAVE A DRINK CONTAINING ALCOHOL: 2-4 TIMES A MONTH
AUDIT-C TOTAL SCORE: 3

## 2024-11-11 ASSESSMENT — PATIENT HEALTH QUESTIONNAIRE - PHQ9: CLINICAL INTERPRETATION OF PHQ2 SCORE: 0

## 2024-11-11 NOTE — TELEPHONE ENCOUNTER
Received request via: Patient    Was the patient seen in the last year in this department? Yes    Does the patient have an active prescription (recently filled or refills available) for medication(s) requested? No    Pharmacy Name: [facility]    Does the patient have [facility] Plus and need 100-day supply? (This applies to ALL medications) Patient does not have SCP    Patient comment: Can i please have 2 refills? The inhalers I have been receiving lately do not last as long as they used to.  Not even close and I cant tell when they are low.

## 2024-11-12 NOTE — PROGRESS NOTES
Subjective:     CC:    Chief Complaint   Patient presents with    Annual Exam     Physical          HISTORY OF THE PRESENT ILLNESS: Patient is a 43 y.o. male here to discuss:    Hereditary hemochromatosis (HCC)  Patient with history of elevated ferritin and heterozygous gene mutation for hemochromatosis.    Hypercholesterolemia  Chronic, compliant with simvastatin.  Needs refill and due for lab follow-up.    Mild intermittent asthma without complication  Patient states he uses his albuterol inhaler 2-3 times per week.  He has around 10 asthma exacerbations per year.  States he does not like using the Symbicort and does not feel it is effective.  Interested in other options.  He does have chronic allergies and recurrent sinus infections.      Health Maintenance/Anticipatory Guidance:  Diet:   - 3 meals per day   - 2-3 servings of fruits/veggies per day   - 64+ ounces of water   - 1 cups of caffeine   - 2 days per week eating out  Exercise:    - Cardio: walking    - Weight/resistance training: has a tonal - needs to restart   - Other activities: golfing  Substance Abuse: no history  Safe in Relationships: yes  Sun Protection/Sunscreen: yes   Dermatologist: due for skin check  Dentist: twice yearly appointments  Eye Doctor: declined    Cancer Screening:  Colorectal Cancer: colonoscopy - will order    Infectious Disease Screening:  STI/HIV screening: declined    Immunizations: flu shot today, will order hep b titers. Pt will think about prevnar.     Depression Screening    Little interest or pleasure in doing things?  0 - not at all  Feeling down, depressed , or hopeless? 0 - not at all  Patient Health Questionnaire Score: 0    The 10-year ASCVD risk score (Neil KRISHNA, et al., 2019) is: 1.1%    Values used to calculate the score:      Age: 43 years      Sex: Male      Is Non- : No      Diabetic: No      Tobacco smoker: No      Systolic Blood Pressure: 118 mmHg      Is BP treated: No      HDL  Cholesterol: 62 mg/dL      Total Cholesterol: 192 mg/dL    Allergies:   Patient has no known allergies.  Current Outpatient Medications Ordered in Epic   Medication Sig Dispense Refill    simvastatin (ZOCOR) 20 MG Tab Take 1 Tablet by mouth every evening. 90 Tablet 3    albuterol 108 (90 Base) MCG/ACT Aero Soln inhalation aerosol Inhale 2 Puffs every four hours as needed for Shortness of Breath. 2 Each 3    montelukast (SINGULAIR) 10 MG Tab Take 1 Tablet by mouth every day. 90 Tablet 3    Budesonide, Inhalation, 1 MG/2ML Suspension USE 1 VIAL VIA NEBULIZER EVERY DAY... NEED INS INFO      Probiotic Product (PROBIOTIC & ACIDOPHILUS EX ST PO) Take 1 Tab by mouth every day.      Multiple Vitamins-Minerals (ONE-A-DAY MENS HEALTH FORMULA PO) Take 1 Tab by mouth every day.      cetirizine (ZYRTEC) 10 MG Tab Take 10 mg by mouth every day.       No current [facility]-ordered facility-administered medications on file.     Past Medical History:   Diagnosis Date    Asthma     Hyperlipidemia     Seasonal allergies      History reviewed. No pertinent surgical history.  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     Family History   Problem Relation Age of Onset    No Known Problems Mother     Heart Disease Father         Pt father  of MI in 50s    Hyperlipidemia Father     Cancer Sister 38        brain cancer - GBM        ROS: see hpi  Gen: no fevers/chills  Pulm: no sob, no cough  CV: no chest pain, no palpitations  GI: no nausea/vomiting, no diarrhea  Skin: no rash      Objective:     Exam: /80 (BP Location: Left arm, Patient Position: Sitting, BP Cuff Size:  "Adult)   Pulse 84   Temp 36.8 °C (98.3 °F) (Temporal)   Resp 16   Ht 1.727 m (5' 8\")   Wt 76.7 kg (169 lb)   SpO2 98%  Body mass index is 25.7 kg/m².    Gen: Alert and oriented, No apparent distress.  HEENT: Head atraumatic, normocephalic. PERRLA. EOMI. B/L TMs pearly gray without erythema or bulge after cerumen removed.  Posterior pharynx non-erythematous.  Neck: Neck is supple without lymphadenopathy.  Full range of motion.  Lungs: Normal effort, CTA bilaterally, no wheezes, rhonchi, or rales  CV: Regular rate and rhythm. No murmurs, rubs, or gallops.  ABD: +BS. Non-tender, non-distended. No rebound, rigidity, or guarding.  Ext: No clubbing, cyanosis, edema.  2+ radial and dorsalis pedis pulses.  Neuro: CN II-XII intact, coordination intact bilaterally, no foot drop.  Skin: No rash or ulcerations.    Ear Wax Removal    Date/Time: 11/11/2024 5:16 PM    Performed by: Rachael Pantoja P.A.-C.  Authorized by: Rachael Pantoja P.A.-C.  Location details: right ear and left ear  Patient tolerance: patient tolerated the procedure well with no immediate complications  Procedure type: curette       Assessment & Plan:   43 y.o. male with the following -    1. Wellness examination  PMH/PSH/FH/Social history reviewed.  Vaccinations discussed.  Previous records and labs reviewed. Discussed age appropriate anticipatory guidance.    2. Hereditary hemochromatosis (HCC)  Chronic, heterozygous.  Continue to monitor labs.  - CBC WITH DIFFERENTIAL; Future  - FERRITIN; Future    3. Mild intermittent asthma without complication  Chronic, uncontrolled.  Will trial Singulair 10 mg daily.  Continue Zyrtec.  May hold Symbicort.  Future PFT if no improvement.  - albuterol 108 (90 Base) MCG/ACT Aero Soln inhalation aerosol; Inhale 2 Puffs every four hours as needed for Shortness of Breath.  Dispense: 2 Each; Refill: 3  - montelukast (SINGULAIR) 10 MG Tab; Take 1 Tablet by mouth every day.  Dispense: 90 Tablet; Refill: 3    4. Other " neutropenia (HCC)  Chronic, continue monitor.  Consider immunology evaluation if persists.  - CBC WITH DIFFERENTIAL; Future  - VITAMIN B12; Future    5. Hypercholesterolemia  Chronic, controlled and stable. Continue current regimen -   - simvastatin (ZOCOR) 20 MG Tab; Take 1 Tablet by mouth every evening.  Dispense: 90 Tablet; Refill: 3  - Lipid Profile; Future    6. Vitamin D deficiency  - VITAMIN D,25 HYDROXY (DEFICIENCY); Future    7. Bilateral impacted cerumen  Monitor for signs of infection such as worsening redness, swelling, warmth, drainage, fever, bone or joint pain.  If you develop any of these symptoms, please seek medical treatment immediately.  - Ear Wax Removal    8. Fatigue, unspecified type  - TESTOSTERONE, FREE AND TOTAL; Future    9. Screening for deficiency anemia  - CBC WITH DIFFERENTIAL; Future  - VITAMIN B12; Future    10. Encounter for vitamin deficiency screening  - VITAMIN B12; Future  - VITAMIN D,25 HYDROXY (DEFICIENCY); Future    11. Diabetes mellitus screening  - Comp Metabolic Panel; Future    12. Screening cholesterol level  - Lipid Profile; Future    13. Screening for thyroid disorder  - TSH WITH REFLEX TO FT4; Future    14. Immunity status testing  - HEP B SURFACE AB; Future  - HEP B SURFACE ANTIGEN; Future    15. Skin cancer screening  - Referral to Dermatology    16. Colon cancer screening  - Referral to GI for Colonoscopy    17. Need for vaccination  - INFLUENZA VACCINE TRI INJ (PF)     Return in about 8 days (around 11/19/2024) for Follow-up labs/tests.    Rachael Pantoja PA-C ([place])  Physician Assistant Certified  [facility]    Please note that this dictation was created using voice recognition software. I have made every reasonable attempt to correct obvious errors, but I expect that there are errors of grammar and possibly content that I did not discover before finalizing the note.

## 2024-11-12 NOTE — ASSESSMENT & PLAN NOTE
Patient states he uses his albuterol inhaler 2-3 times per week.  He has around 10 asthma exacerbations per year.  States he does not like using the Symbicort and does not feel it is effective.  Interested in other options.  He does have chronic allergies and recurrent sinus infections.

## 2024-11-14 ENCOUNTER — HOSPITAL ENCOUNTER (OUTPATIENT)
Dept: LAB | Facility: MEDICAL CENTER | Age: 44
End: 2024-11-14
Attending: PHYSICIAN ASSISTANT
Payer: COMMERCIAL

## 2024-11-14 DIAGNOSIS — R53.83 FATIGUE, UNSPECIFIED TYPE: ICD-10-CM

## 2024-11-14 DIAGNOSIS — D70.8 OTHER NEUTROPENIA (HCC): ICD-10-CM

## 2024-11-14 DIAGNOSIS — Z13.29 SCREENING FOR THYROID DISORDER: ICD-10-CM

## 2024-11-14 DIAGNOSIS — E78.00 HYPERCHOLESTEROLEMIA: ICD-10-CM

## 2024-11-14 DIAGNOSIS — E83.110 HEREDITARY HEMOCHROMATOSIS (HCC): ICD-10-CM

## 2024-11-14 DIAGNOSIS — Z13.21 ENCOUNTER FOR VITAMIN DEFICIENCY SCREENING: ICD-10-CM

## 2024-11-14 DIAGNOSIS — E55.9 VITAMIN D DEFICIENCY: ICD-10-CM

## 2024-11-14 DIAGNOSIS — Z13.1 DIABETES MELLITUS SCREENING: ICD-10-CM

## 2024-11-14 DIAGNOSIS — Z13.0 SCREENING FOR DEFICIENCY ANEMIA: ICD-10-CM

## 2024-11-14 DIAGNOSIS — Z01.84 IMMUNITY STATUS TESTING: ICD-10-CM

## 2024-11-14 DIAGNOSIS — Z13.220 SCREENING CHOLESTEROL LEVEL: ICD-10-CM

## 2024-11-14 LAB
25(OH)D3 SERPL-MCNC: 24 NG/ML (ref 30–100)
ALBUMIN SERPL BCP-MCNC: 4.5 G/DL (ref 3.2–4.9)
ALBUMIN/GLOB SERPL: 1.6 G/DL
ALP SERPL-CCNC: 63 U/L (ref 30–99)
ALT SERPL-CCNC: 26 U/L (ref 2–50)
ANION GAP SERPL CALC-SCNC: 13 MMOL/L (ref 7–16)
AST SERPL-CCNC: 21 U/L (ref 12–45)
BASOPHILS # BLD AUTO: 1.4 % (ref 0–1.8)
BASOPHILS # BLD: 0.05 K/UL (ref 0–0.12)
BILIRUB SERPL-MCNC: 0.4 MG/DL (ref 0.1–1.5)
BUN SERPL-MCNC: 11 MG/DL (ref 8–22)
CALCIUM ALBUM COR SERPL-MCNC: 8.9 MG/DL (ref 8.5–10.5)
CALCIUM SERPL-MCNC: 9.3 MG/DL (ref 8.5–10.5)
CHLORIDE SERPL-SCNC: 103 MMOL/L (ref 96–112)
CHOLEST SERPL-MCNC: 202 MG/DL (ref 100–199)
CO2 SERPL-SCNC: 24 MMOL/L (ref 20–33)
CREAT SERPL-MCNC: 0.89 MG/DL (ref 0.5–1.4)
EOSINOPHIL # BLD AUTO: 0.26 K/UL (ref 0–0.51)
EOSINOPHIL NFR BLD: 7.2 % (ref 0–6.9)
ERYTHROCYTE [DISTWIDTH] IN BLOOD BY AUTOMATED COUNT: 41.4 FL (ref 35.9–50)
FERRITIN SERPL-MCNC: 399 NG/ML (ref 22–322)
GFR SERPLBLD CREATININE-BSD FMLA CKD-EPI: 108 ML/MIN/1.73 M 2
GLOBULIN SER CALC-MCNC: 2.9 G/DL (ref 1.9–3.5)
GLUCOSE SERPL-MCNC: 90 MG/DL (ref 65–99)
HBV SURFACE AB SERPL IA-ACNC: ABNORMAL MIU/ML (ref 0–10)
HBV SURFACE AG SER QL: NORMAL
HCT VFR BLD AUTO: 51.1 % (ref 42–52)
HDLC SERPL-MCNC: 57 MG/DL
HGB BLD-MCNC: 17.1 G/DL (ref 14–18)
IMM GRANULOCYTES # BLD AUTO: 0.01 K/UL (ref 0–0.11)
IMM GRANULOCYTES NFR BLD AUTO: 0.3 % (ref 0–0.9)
LDLC SERPL CALC-MCNC: 114 MG/DL
LYMPHOCYTES # BLD AUTO: 1.48 K/UL (ref 1–4.8)
LYMPHOCYTES NFR BLD: 40.8 % (ref 22–41)
MCH RBC QN AUTO: 30.2 PG (ref 27–33)
MCHC RBC AUTO-ENTMCNC: 33.5 G/DL (ref 32.3–36.5)
MCV RBC AUTO: 90.3 FL (ref 81.4–97.8)
MONOCYTES # BLD AUTO: 0.44 K/UL (ref 0–0.85)
MONOCYTES NFR BLD AUTO: 12.1 % (ref 0–13.4)
NEUTROPHILS # BLD AUTO: 1.39 K/UL (ref 1.82–7.42)
NEUTROPHILS NFR BLD: 38.2 % (ref 44–72)
NRBC # BLD AUTO: 0 K/UL
NRBC BLD-RTO: 0 /100 WBC (ref 0–0.2)
PLATELET # BLD AUTO: 291 K/UL (ref 164–446)
PMV BLD AUTO: 9.3 FL (ref 9–12.9)
POTASSIUM SERPL-SCNC: 4.3 MMOL/L (ref 3.6–5.5)
PROT SERPL-MCNC: 7.4 G/DL (ref 6–8.2)
RBC # BLD AUTO: 5.66 M/UL (ref 4.7–6.1)
SODIUM SERPL-SCNC: 140 MMOL/L (ref 135–145)
TRIGL SERPL-MCNC: 155 MG/DL (ref 0–149)
TSH SERPL DL<=0.005 MIU/L-ACNC: 3.92 UIU/ML (ref 0.38–5.33)
VIT B12 SERPL-MCNC: 532 PG/ML (ref 211–911)
WBC # BLD AUTO: 3.6 K/UL (ref 4.8–10.8)

## 2024-11-14 PROCEDURE — 82728 ASSAY OF FERRITIN: CPT

## 2024-11-14 PROCEDURE — 82607 VITAMIN B-12: CPT

## 2024-11-14 PROCEDURE — 85025 COMPLETE CBC W/AUTO DIFF WBC: CPT

## 2024-11-14 PROCEDURE — 84443 ASSAY THYROID STIM HORMONE: CPT

## 2024-11-14 PROCEDURE — 82306 VITAMIN D 25 HYDROXY: CPT

## 2024-11-14 PROCEDURE — 87340 HEPATITIS B SURFACE AG IA: CPT

## 2024-11-14 PROCEDURE — 80053 COMPREHEN METABOLIC PANEL: CPT

## 2024-11-14 PROCEDURE — 84402 ASSAY OF FREE TESTOSTERONE: CPT

## 2024-11-14 PROCEDURE — 84270 ASSAY OF SEX HORMONE GLOBUL: CPT

## 2024-11-14 PROCEDURE — 36415 COLL VENOUS BLD VENIPUNCTURE: CPT

## 2024-11-14 PROCEDURE — 86706 HEP B SURFACE ANTIBODY: CPT

## 2024-11-14 PROCEDURE — 84403 ASSAY OF TOTAL TESTOSTERONE: CPT

## 2024-11-14 PROCEDURE — 80061 LIPID PANEL: CPT

## 2024-11-16 LAB
SHBG SERPL-SCNC: 59 NMOL/L (ref 17–56)
TESTOST FREE MFR SERPL: 1.4 % (ref 1.6–2.9)
TESTOST FREE SERPL-MCNC: 90 PG/ML (ref 47–244)
TESTOST SERPL-MCNC: 657 NG/DL (ref 300–890)

## 2024-11-20 ENCOUNTER — TELEMEDICINE (OUTPATIENT)
Dept: MEDICAL GROUP | Facility: IMAGING CENTER | Age: 44
End: 2024-11-20
Payer: COMMERCIAL

## 2024-11-20 VITALS — BODY MASS INDEX: 25.46 KG/M2 | HEIGHT: 68 IN | WEIGHT: 168 LBS

## 2024-11-20 DIAGNOSIS — J45.20 MILD INTERMITTENT ASTHMA WITHOUT COMPLICATION: ICD-10-CM

## 2024-11-20 DIAGNOSIS — Z13.79 GENETIC TESTING: ICD-10-CM

## 2024-11-20 DIAGNOSIS — E78.00 HYPERCHOLESTEROLEMIA: ICD-10-CM

## 2024-11-20 DIAGNOSIS — Z91.89 10 YEAR RISK OF MI OR STROKE < 7.5%: ICD-10-CM

## 2024-11-20 DIAGNOSIS — E55.9 VITAMIN D DEFICIENCY: ICD-10-CM

## 2024-11-20 DIAGNOSIS — D70.8 OTHER NEUTROPENIA (HCC): ICD-10-CM

## 2024-11-20 PROCEDURE — 99214 OFFICE O/P EST MOD 30 MIN: CPT | Mod: 95 | Performed by: PHYSICIAN ASSISTANT

## 2024-11-20 RX ORDER — ERGOCALCIFEROL 1.25 MG/1
50000 CAPSULE, LIQUID FILLED ORAL
Qty: 12 CAPSULE | Refills: 1 | Status: SHIPPED | OUTPATIENT
Start: 2024-11-20

## 2024-11-20 RX ORDER — ERGOCALCIFEROL 1.25 MG/1
50000 CAPSULE, LIQUID FILLED ORAL
Qty: 12 CAPSULE | Refills: 1 | Status: CANCELLED | OUTPATIENT
Start: 2024-11-20

## 2024-11-20 ASSESSMENT — FIBROSIS 4 INDEX: FIB4 SCORE: 0.62

## 2024-11-20 ASSESSMENT — PAIN SCALES - GENERAL: PAINLEVEL_OUTOF10: NO PAIN

## 2024-11-20 NOTE — PROGRESS NOTES
Virtual Visit: Established Patient   This evaluation was conducted via Teams using secure and encrypted videoconferencing technology. The patient was in a private location outside of their home in the state of [place].    The patient's identity was confirmed and verbal consent was obtained for this virtual visit.    Subjective:   CC:   Chief Complaint   Patient presents with    Follow-Up     On lab results from 11/14/24       Mariano Mata is a 43 y.o. male presenting for evaluation and management of:    Hypercholesterolemia  Chronic, on simvastatin 20 mg nightly.  Has not completed the familial hypercholesterolemia genetic screening yet.  Does have a family history of early heart disease in his father in his 50s.    Mild intermittent asthma without complication  Chronic, overall improved with the addition of montelukast 10 mg daily.  Using his albuterol less.    Other neutropenia (HCC)  Chronic, negative workup.  No recurrent infections.    Vitamin D deficiency  Not currently taking vitamin D.  States he does get adequate sun exposure daily.      The 10-year ASCVD risk score (Neil KRISHNA, et al., 2019) is: 1.3%    Values used to calculate the score:      Age: 43 years      Sex: Male      Is Non- : No      Diabetic: No      Tobacco smoker: No      Systolic Blood Pressure: 118 mmHg      Is BP treated: No      HDL Cholesterol: 57 mg/dL      Total Cholesterol: 202 mg/dL    ROS   Denies any recent fevers or chills. No nausea or vomiting. No chest pains or shortness of breath.     No Known Allergies    Current medicines (including changes today)  Current Outpatient Medications   Medication Sig Dispense Refill    vitamin D2, Ergocalciferol, (DRISDOL) 1.25 MG (80261 UT) Cap capsule Take 1 Capsule by mouth every 7 days. 12 Capsule 1    simvastatin (ZOCOR) 20 MG Tab Take 1 Tablet by mouth every evening. 90 Tablet 3    albuterol 108 (90 Base) MCG/ACT Aero Soln inhalation aerosol Inhale 2 Puffs  "every four hours as needed for Shortness of Breath. 2 Each 3    montelukast (SINGULAIR) 10 MG Tab Take 1 Tablet by mouth every day. 90 Tablet 3    Budesonide, Inhalation, 1 MG/2ML Suspension USE 1 VIAL VIA NEBULIZER EVERY DAY... NEED INS INFO      Probiotic Product (PROBIOTIC & ACIDOPHILUS EX ST PO) Take 1 Tab by mouth every day.      Multiple Vitamins-Minerals (ONE-A-DAY MENS HEALTH FORMULA PO) Take 1 Tab by mouth every day.      cetirizine (ZYRTEC) 10 MG Tab Take 10 mg by mouth every day.       No current facility-administered medications for this visit.       Patient Active Problem List    Diagnosis Date Noted    Bilateral impacted cerumen 2024    Other neutropenia (HCC) 2023    Vitamin D deficiency 2023    Family history of heart attack 2022    Hereditary hemochromatosis (HCC) 2022    High serum ferritin 2021    Hypercholesterolemia 2021    Family history of disorder of metabolism 2021    Fatigue 2020    Genital herpes 2016    Mild intermittent asthma without complication 2016       Family History   Problem Relation Age of Onset    No Known Problems Mother     Heart Disease Father         Pt father  of MI in 50s    Hyperlipidemia Father     Cancer Sister 38        brain cancer - GBM       He  has a past medical history of Asthma, Hyperlipidemia, and Seasonal allergies.  He  has no past surgical history on file.         Objective:   Ht 1.727 m (5' 8\")   Wt 76.2 kg (168 lb)   BMI 25.54 kg/m²   RR 12    Physical Exam:  Constitutional: Alert, no distress, well-groomed.  Skin: No rashes in visible areas.  Eye: Round. Conjunctiva clear, lids normal. No icterus.   ENMT: Lips pink without lesions, good dentition, moist mucous membranes. Phonation normal.  Neck: No masses, no thyromegaly. Moves freely without pain.  Respiratory: Unlabored respiratory effort, no cough or audible wheeze  Psych: Alert and oriented x3, normal affect and mood. "     Assessment and Plan:   The following treatment plan was discussed:     1. Hypercholesterolemia  Chronic, currently controlled and stable.  Will check calcium score.  If elevated, will refer to cardiology due to early family history of CAD.  Encouraged patient to complete familial hypercholesterolemia screening.  If calcium score 0 and negative familial hypercholesterolemia screening, may consider holding statin and repeating calcium score in 3 years.  - CT-CARDIAC SCORING; Future    2. 10 year risk of MI or stroke < 7.5%  - CT-CARDIAC SCORING; Future    3. Mild intermittent asthma without complication  Chronic, controlled and stable. Continue current regimen -montelukast 10 mg daily, albuterol as needed.    4. Other neutropenia (HCC)  Chronic, controlled and stable. Continue current regimen -continued monitoring.  Immunology evaluation if recurrent infections.    5. Vitamin D deficiency  Recheck vitamin D in 6 months.  Take with food.  - vitamin D2, Ergocalciferol, (DRISDOL) 1.25 MG (63246 UT) Cap capsule; Take 1 Capsule by mouth every 7 days.  Dispense: 12 Capsule; Refill: 1    6. Genetic testing  - Referral to Genetic Research Studies    Follow-up: Return in about 6 months (around 5/20/2025) for Follow-up, Will notify patient to follow-up pending tests.         Rachael Pantoja PA-C ([place])  Physician Assistant Certified  [facility]    Please note that this dictation was created using voice recognition software. I have made every reasonable attempt to correct obvious errors, but I expect that there are errors of grammar and possibly content that I did not discover before finalizing the note.

## 2024-11-20 NOTE — ASSESSMENT & PLAN NOTE
Chronic, overall improved with the addition of montelukast 10 mg daily.  Using his albuterol less.

## 2024-11-20 NOTE — PATIENT INSTRUCTIONS
It was a pleasure meeting with you today at [facility]!    Your medical history/records and medications were reviewed today.     UPDATE on MyChart Results: If you have blood work, and/or imaging studies, or any other test or procedure completed, you will have access to results as soon as they become available in MyChart. Recently, these results will be available for review at the same time that your provider is able to see results!    This will likely mean you will see a result before your provider has had a chance to review and discuss with you.  Some results or care notes may be hard to understand and may be serious in nature.    We look at every result and your provider will contact you to explain what they mean and discuss appropriate next steps. Please allow for at least 72 business hours for chart and result review.     We prefer that you wait for your care team to contact you with your results.  Often, your provider will discuss your results with you at your next appointment. We look forward to continuing to partner with you in your care.    Please review my practice information below:    If you have any prescription refill requests, please send them via Stream or discuss with your provider at the start of your office visits. Please allow 3-5 business days for lab and testing review and you will be contacted via Stream with those results, or if advised to make a follow up appointment regarding those results, then please do so.     Once resulted, your lab/test/imaging results will show up automatically in your MyChart. Please wait for my interpretation and recommendations prior to viewing your results to avoid any unnecessary confusion or misinterpretation. I will address all of the lab values that I interpret as abnormal and message you accordingly on your MyChart. I will always send you a message about your results even if they are normal. If you do not hear back from me within 5-7 business  days after completing your tests, then please send me a message on happin! so I can obtain your results (especially if you went to an outside lab or imaging center - [facility], [facility], etc).     If you have any additional questions or concerns beyond my interpretation of your results, please make an appointment with me to discuss in further detail.    Please only use the happin! messaging system for questions regarding your most recent appointment or if advised to use otherwise (glucose or blood pressure reporting).     If you have any new problems or concerns, you must make an appointment to discuss. This includes any referral requests, lab requests (unless advised to notify me for pre-appt labs), medication side effects, or request for medication adjustments.     Please arrive 15 minutes prior to your appointment time to complete your check-in and intake with the medical assistant.      Thank you,    Rachael Pantoja PA-C ([place])  Physician Assistant Certified  [facility]    -----------------------------------------------------------------    Attn: Patients of [facility]:    In an effort to continue to provide excellent and efficient care to our patients, it is vital that we continue to use our resources appropriately. With that, this is a reminder that happin! is used for prescription refill requests, test results, virtual visits, and chart review only.     Any new questions, concerns/conditions, lab/imaging requests, medication adjustments, new prescriptions, or referral requests do require an appointment (virtually or in person), unless discussed otherwise at your most recent appointment.     Thank you for your understanding,    [facility]      0

## 2024-11-20 NOTE — ASSESSMENT & PLAN NOTE
Chronic, on simvastatin 20 mg nightly.  Has not completed the familial hypercholesterolemia genetic screening yet.  Does have a family history of early heart disease in his father in his 50s.

## 2024-11-26 ENCOUNTER — HOSPITAL ENCOUNTER (OUTPATIENT)
Dept: RADIOLOGY | Facility: MEDICAL CENTER | Age: 44
End: 2024-11-26
Attending: PHYSICIAN ASSISTANT
Payer: COMMERCIAL

## 2024-11-26 ENCOUNTER — DOCUMENTATION (OUTPATIENT)
Dept: HEALTH INFORMATION MANAGEMENT | Facility: OTHER | Age: 44
End: 2024-11-26

## 2024-11-26 DIAGNOSIS — E78.00 HYPERCHOLESTEROLEMIA: ICD-10-CM

## 2024-11-26 DIAGNOSIS — Z91.89 10 YEAR RISK OF MI OR STROKE < 7.5%: ICD-10-CM

## 2024-11-26 PROCEDURE — 4410556 CT-CARDIAC SCORING (SELF PAY ONLY)

## 2025-05-15 ENCOUNTER — TELEMEDICINE (OUTPATIENT)
Dept: MEDICAL GROUP | Facility: IMAGING CENTER | Age: 45
End: 2025-05-15
Payer: COMMERCIAL

## 2025-05-15 VITALS — BODY MASS INDEX: 25.76 KG/M2 | WEIGHT: 170 LBS | HEIGHT: 68 IN

## 2025-05-15 DIAGNOSIS — J01.41 ACUTE RECURRENT PANSINUSITIS: Primary | ICD-10-CM

## 2025-05-15 DIAGNOSIS — J45.20 MILD INTERMITTENT ASTHMA WITHOUT COMPLICATION: ICD-10-CM

## 2025-05-15 PROCEDURE — 99214 OFFICE O/P EST MOD 30 MIN: CPT | Mod: 95

## 2025-05-15 RX ORDER — METHYLPREDNISOLONE 4 MG/1
TABLET ORAL
Qty: 21 TABLET | Refills: 0 | Status: SHIPPED | OUTPATIENT
Start: 2025-05-15

## 2025-05-15 RX ORDER — AZITHROMYCIN 250 MG/1
250 TABLET, FILM COATED ORAL DAILY
Qty: 6 TABLET | Refills: 0 | Status: SHIPPED | OUTPATIENT
Start: 2025-05-15

## 2025-05-15 ASSESSMENT — PAIN SCALES - GENERAL: PAINLEVEL_OUTOF10: NO PAIN

## 2025-05-15 ASSESSMENT — FIBROSIS 4 INDEX: FIB4 SCORE: 0.64

## 2025-05-15 ASSESSMENT — PATIENT HEALTH QUESTIONNAIRE - PHQ9: CLINICAL INTERPRETATION OF PHQ2 SCORE: 0

## 2025-05-15 NOTE — PROGRESS NOTES
Virtual Visit: Established Patient   This visit was conducted via Teams using secure and encrypted videoconferencing technology.   The patient was in their home in the [facility].    The patient's identity was confirmed and verbal consent was obtained for this virtual visit.    Subjective:     CC:   Chief Complaint   Patient presents with    Sinus Problem     Pt reports sinus infections, is having symptoms since 1 week ago, feels worse everyday, ears ringing, congestion in nose, yellow-clear mucus, pressure in sinuses,     Mariano Mata is a 44 y.o. male presenting for evaluation and management of:    Sinusitis  Pt  reports developing symptoms of sinus congestion, pressure, pain, discharge with associated symptoms of bilateral ear ringing, muffled hearing, headache, brain fog from severe congestion that started over a week ago.  He currently uses Flonase, Singulair, and OTC antihistamines without relief.  His symptoms are worsening.    He admits having history of recurrent sinusitis.  Would get 1-2 sinus infections requiring antibiotic treatment.    He has seen an ENT status post septum surgery hoping that his sinus problems would resolve. Unfortunately, he continues to have sinus infection.     Denies persistent high fevers, chills, lethargy, fatigue, malaise, facial swelling, visual changes, weakness, elevated heart rate, stiff neck, prolonged cough, persistent wheezing, leg swelling, trouble breathing, persistent pain or pressure in the chest, confusion, inability to wake or stay awake, bluish lips or face, persistent tachycardia (fast heart rate), prolonged dizziness, or fainting.      ROS per hpi      Current medicines (including changes today)  Current Medications[1]    Patient Active Problem List    Diagnosis Date Noted    Bilateral impacted cerumen 11/11/2024    Other neutropenia (HCC) 06/29/2023    Vitamin D deficiency 06/29/2023    Family history of heart attack 03/28/2022    Hereditary  "hemochromatosis (HCC) 03/28/2022    High serum ferritin 03/16/2021    Hypercholesterolemia 03/16/2021    Family history of disorder of metabolism 01/06/2021    Fatigue 12/01/2020    Genital herpes 01/13/2016    Mild intermittent asthma without complication 01/13/2016        Objective:   Ht 1.727 m (5' 8\")   Wt 77.1 kg (170 lb)   BMI 25.85 kg/m²     Physical examination done through observation  Constitutional: Alert, no distress, well-groomed.  Skin: Warm, dry, good turgor, no rashes in visible areas.  Eye: Equal, round and reactive, conjunctiva clear, lids normal.  ENMT: Lips without lesions, good dentition, moist mucous membranes.  Neck: Trachea midline, no masses, no thyromegaly.  Respiratory: Unlabored respiratory effort, no cough.  MSK: Normal gait, moves all extremities.  Neuro: Grossly non-focal.   Psych: Alert and oriented x3, normal affect and mood.      Assessment and Plan:   The following treatment plan was discussed:     1. Acute recurrent pansinusitis (Primary)  Acute on chronic condition w/recent exacerbation.  Patient presentation consistent with acute recurrent pansinusitis refractory to OTC medication.  Due to severe congestion and inflammation, mutual decision making-patient agreeable to Medrol Dosepak. Advised patient to start Medrol Dosepak for 6 days.  Side effect such as increased hunger, weight gain, jitteriness, difficulty sleeping, extreme changes in mood, and changes in personality discussed with patient.   Patient reports zpack has been effective in reading his sinusitis in the past.    Will send prescription for Z-Quoc to his pharmacy.    Medication administration and side effects discussed.  Acute sinusitis is most commonly caused by viral infection, typically lasts up to 10 days and typically resolves. And is usually treatment with supportive care.     Recommend:  -mist humidifier at home  -use nasal saline wash (i.e. Nedi-Pot)  -use warm compress over face to help alleviate " congestion  -may take OTC decongestant such as Oxymetazoline (Afrin) 0.05% spray; DO NOT USE LONGER THAN 3 DAYS, CAN CAUSE REBOUND CONGESTION  -may use nasal corticosteroid spray such as Flonase, 2 sprays each nostril daily  -may consider OTC anti-histamine such as Zyrtec or Claritin for allergy symptoms    Go to ED for symptoms of persistent high fever, fatigue, malaise, periorbital edema/swelling, vision changes, stick neck, severe headache, altered mental status, facial numbness/tingling/weakness.    - methylPREDNISolone (MEDROL DOSEPAK) 4 MG Tablet Therapy Pack; As directed on the packaging label.  Dispense: 21 Tablet; Refill: 0  - azithromycin (ZITHROMAX) 250 MG Tab; Take 1 Tablet by mouth every day. Take 2 tablets on day 1, then take 1 tablets daily until prescription is complete  Dispense: 6 Tablet; Refill: 0    HCC Gap Form    Diagnosis to address: J45.20 - Mild intermittent asthma without complication  Assessment and plan: Chronic, stable. Continue with current defined treatment plan: albuterol inhaler. Follow-up at least annually.  Last edited 05/15/25 09:19 PDT by SILVA Jones           Follow-up: Return if symptoms worsen or fail to improve.              [1]   Current Outpatient Medications   Medication Sig Dispense Refill    methylPREDNISolone (MEDROL DOSEPAK) 4 MG Tablet Therapy Pack As directed on the packaging label. 21 Tablet 0    azithromycin (ZITHROMAX) 250 MG Tab Take 1 Tablet by mouth every day. Take 2 tablets on day 1, then take 1 tablets daily until prescription is complete 6 Tablet 0    vitamin D2, Ergocalciferol, (DRISDOL) 1.25 MG (33755 UT) Cap capsule Take 1 Capsule by mouth every 7 days. 12 Capsule 1    simvastatin (ZOCOR) 20 MG Tab Take 1 Tablet by mouth every evening. 90 Tablet 3    albuterol 108 (90 Base) MCG/ACT Aero Soln inhalation aerosol Inhale 2 Puffs every four hours as needed for Shortness of Breath. 2 Each 3    montelukast (SINGULAIR) 10 MG Tab Take 1 Tablet by  mouth every day. 90 Tablet 3    Probiotic Product (PROBIOTIC & ACIDOPHILUS EX ST PO) Take 1 Tab by mouth every day.      Multiple Vitamins-Minerals (ONE-A-DAY MENS HEALTH FORMULA PO) Take 1 Tab by mouth every day.      cetirizine (ZYRTEC) 10 MG Tab Take 10 mg by mouth every day.      Budesonide, Inhalation, 1 MG/2ML Suspension USE 1 VIAL VIA NEBULIZER EVERY DAY... NEED INS INFO       No current facility-administered medications for this visit.